# Patient Record
Sex: FEMALE | Race: WHITE | NOT HISPANIC OR LATINO | Employment: UNEMPLOYED | URBAN - METROPOLITAN AREA
[De-identification: names, ages, dates, MRNs, and addresses within clinical notes are randomized per-mention and may not be internally consistent; named-entity substitution may affect disease eponyms.]

---

## 2018-06-26 ENCOUNTER — CONVERSION ENCOUNTER (OUTPATIENT)
Dept: FAMILY MEDICINE CLINIC | Facility: CLINIC | Age: 38
End: 2018-06-26

## 2019-06-04 VITALS
OXYGEN SATURATION: 98 % | HEART RATE: 64 BPM | HEIGHT: 62 IN | DIASTOLIC BLOOD PRESSURE: 80 MMHG | BODY MASS INDEX: 23.3 KG/M2 | SYSTOLIC BLOOD PRESSURE: 129 MMHG | WEIGHT: 126.6 LBS

## 2019-07-15 RX ORDER — LISINOPRIL 2.5 MG/1
TABLET ORAL
Qty: 30 TABLET | Refills: 14 | Status: SHIPPED | OUTPATIENT
Start: 2019-07-15 | End: 2020-02-11

## 2019-07-15 RX ORDER — CARVEDILOL 3.12 MG/1
TABLET ORAL
Qty: 60 TABLET | Refills: 14 | Status: SHIPPED | OUTPATIENT
Start: 2019-07-15 | End: 2020-02-11

## 2019-08-28 ENCOUNTER — TELEPHONE (OUTPATIENT)
Dept: CARDIOLOGY | Facility: CLINIC | Age: 39
End: 2019-08-28

## 2019-08-28 NOTE — TELEPHONE ENCOUNTER
Patient called this am and l/m v/m that patient of Dr. Marshall and Dr. Hawthorne .   Stated needs a note stating that alright for her to work.   Patient last seen in office 6-26-18 by Dr. Domingo.  No follow up appointment on file.     Called # , but unsure that would allow to leave v/m , but did l/m to call office.

## 2019-08-29 NOTE — TELEPHONE ENCOUNTER
Called # again today and l/m v/m that needs to call office re: request for note to work/ not seen for awhile and call appt desk for appt/   Again unsure that # allowed to leave v/m .

## 2019-10-04 ENCOUNTER — OFFICE VISIT (OUTPATIENT)
Dept: CARDIOLOGY | Facility: CLINIC | Age: 39
End: 2019-10-04

## 2019-10-04 ENCOUNTER — CLINICAL SUPPORT NO REQUIREMENTS (OUTPATIENT)
Dept: CARDIOLOGY | Facility: CLINIC | Age: 39
End: 2019-10-04

## 2019-10-04 VITALS
WEIGHT: 134.2 LBS | DIASTOLIC BLOOD PRESSURE: 78 MMHG | HEART RATE: 65 BPM | OXYGEN SATURATION: 98 % | BODY MASS INDEX: 24.55 KG/M2 | SYSTOLIC BLOOD PRESSURE: 116 MMHG | RESPIRATION RATE: 18 BRPM

## 2019-10-04 DIAGNOSIS — Z95.810 ICD (IMPLANTABLE CARDIOVERTER-DEFIBRILLATOR) IN PLACE: ICD-10-CM

## 2019-10-04 DIAGNOSIS — I50.22 CHRONIC SYSTOLIC CONGESTIVE HEART FAILURE (HCC): ICD-10-CM

## 2019-10-04 DIAGNOSIS — I25.5 ISCHEMIC CARDIOMYOPATHY: Primary | ICD-10-CM

## 2019-10-04 DIAGNOSIS — I42.0 DILATED CARDIOMYOPATHY (HCC): ICD-10-CM

## 2019-10-04 DIAGNOSIS — R00.2 PALPITATIONS: Primary | ICD-10-CM

## 2019-10-04 PROCEDURE — 93282 PRGRMG EVAL IMPLANTABLE DFB: CPT | Performed by: INTERNAL MEDICINE

## 2019-10-04 PROCEDURE — 99214 OFFICE O/P EST MOD 30 MIN: CPT | Performed by: INTERNAL MEDICINE

## 2019-10-04 PROCEDURE — 93000 ELECTROCARDIOGRAM COMPLETE: CPT | Performed by: INTERNAL MEDICINE

## 2019-10-04 RX ORDER — NAPROXEN 500 MG/1
500 TABLET ORAL 2 TIMES DAILY
Refills: 0 | COMMUNITY
Start: 2019-09-09 | End: 2020-01-20

## 2019-10-04 RX ORDER — ASPIRIN 325 MG
1 TABLET ORAL DAILY
COMMUNITY
Start: 2015-03-10 | End: 2020-08-10 | Stop reason: SDUPTHER

## 2019-10-04 NOTE — PROGRESS NOTES
CC--cardiomyopathy    Sub--  39-year-old very pleasant patient with history of nonischemic cardiomyopathy underwent a single-chamber ICD implantation nearly 2 years ago for primary prevention and she is lost to follow-up because of insurance reasons and comes in for reestablishment to our office.  She continues to have class II fatigue, dyspnea and atypical chest pain without any syncope  She is able to take low-dose of beta-blockers and ACE inhibitors and denies any active leg edema or any syncope or dizziness since last visit  Patient denies any palpitations  She is trying to stop smoking  Chronic medical problems include nonischemic cardiomyopathy, ICD in situ, noncompliance and tobacco abuse        Past Medical History:   Diagnosis Date   • Alcohol abuse    • Cardiomyopathy (CMS/HCC)    • Cocaine abuse (CMS/HCC)    • History of LEEP (loop electrosurgical excision procedure) of cervix complicating pregnancy    • Myocardial infarction (CMS/HCC)      Past Surgical History:   Procedure Laterality Date   • CARDIAC CATHETERIZATION     • CARDIAC DEFIBRILLATOR PLACEMENT       Family History   Problem Relation Age of Onset   • Heart failure Mother      Social History     Tobacco Use   • Smoking status: Current Every Day Smoker     Packs/day: 0.75   • Smokeless tobacco: Never Used   Substance Use Topics   • Alcohol use: No     Frequency: Never   • Drug use: Yes     Comment: 1 year        (Not in a hospital admission)  Allergies:  Sulfa antibiotics    Review of Systems   General:  positive for fatigue and tiredness  Eyes: No redness  Cardiovascular: No chest pain, no palpitations  Respiratory:   positive for class 2 shortness of breath  Gastrointestinal: No nausea or vomiting, bleeding  Genitourinary: no hematuria or dysuria  Musculoskeletal: No arthralgia or myalgia  Skin: No rash  Neurologic: No numbness, tingling, syncope  Hematologic/Lymphatic: No abnormal bleeding      Physical Exam  VITALS REVIEWED--116/78, AL--65,  RR-12, AFEBRILE    General:      well developed, well nourished, in no acute distress.    Head:      normocephalic and atraumatic.    Eyes:      PERRL/EOM intact, conjunctiva and sclera clear with out nystagmus.    Neck:      no masses, thyromegaly,  trachea central with normal respiratory effort and PMI displaced laterally  Lungs:      clear bilaterally to auscultation.    Heart:       SINUS RHYTHM without any murmurs gallops or rubs, ICD SITE IS CLEAN  Msk:      no deformity or scoliosis noted of thoracic or lumbar spine.    Pulses:      pulses normal in all 4 extremities.    Extremities:       no cyanosis or clubbing--NO edema.    Neurologic:      no focal deficits.   alert oriented x3  Skin:      intact without lesions or rashes.    Psych:      alert and cooperative; normal mood and affect; normal attention span and concentration.          EKG shows sinus rhythm heart rate of 70 old septal infarct type of pattern [place] interval of 188 QRS of 75 QT interval 408 with right axis deviation and no significant changes compared to prior ECG and ECG indication includes cardiomyopathy    A/P  NON ISCHEMIC CARDIOMYOPATHY  CLASS 2 COMPENSATED CHF  TOBACCO ABUSE  ICD IN SITU WITH NORMAL FX--INTERROGATION ATTACHED  SMOKING CESSATION ENCOURAGED  ADVISED NICOTINE PATCH  MEDS REVIEWED  FOLLOW UP IN 6 MONTHS  Echo cardiography ordered to evaluate LV function for prognostication

## 2020-01-08 ENCOUNTER — HOSPITAL ENCOUNTER (OUTPATIENT)
Dept: CARDIOLOGY | Facility: HOSPITAL | Age: 40
Discharge: HOME | End: 2020-01-08
Admitting: INTERNAL MEDICINE

## 2020-01-08 VITALS
BODY MASS INDEX: 24.29 KG/M2 | HEIGHT: 62 IN | WEIGHT: 132 LBS | DIASTOLIC BLOOD PRESSURE: 69 MMHG | HEART RATE: 64 BPM | SYSTOLIC BLOOD PRESSURE: 135 MMHG

## 2020-01-08 DIAGNOSIS — I50.22 CHRONIC SYSTOLIC CONGESTIVE HEART FAILURE (HCC): ICD-10-CM

## 2020-01-08 DIAGNOSIS — I42.0 DILATED CARDIOMYOPATHY (HCC): ICD-10-CM

## 2020-01-08 PROCEDURE — 93306 TTE W/DOPPLER COMPLETE: CPT

## 2020-01-09 LAB
ASCENDING AORTA: 2.6 CM
BH CV ECHO MEAS - AO MAX PG (FULL): 5.5 MMHG
BH CV ECHO MEAS - AO MAX PG: 9.6 MMHG
BH CV ECHO MEAS - AO MEAN PG (FULL): 2.1 MMHG
BH CV ECHO MEAS - AO MEAN PG: 4 MMHG
BH CV ECHO MEAS - AO ROOT AREA (BSA CORRECTED): 1.8
BH CV ECHO MEAS - AO ROOT AREA: 6.9 CM^2
BH CV ECHO MEAS - AO ROOT DIAM: 3 CM
BH CV ECHO MEAS - AO V2 MAX: 155.1 CM/SEC
BH CV ECHO MEAS - AO V2 MEAN: 92.2 CM/SEC
BH CV ECHO MEAS - AO V2 VTI: 33.1 CM
BH CV ECHO MEAS - ASC AORTA: 2.6 CM
BH CV ECHO MEAS - AVA(I,A): 2.7 CM^2
BH CV ECHO MEAS - AVA(I,D): 2.7 CM^2
BH CV ECHO MEAS - AVA(V,A): 2.4 CM^2
BH CV ECHO MEAS - AVA(V,D): 2.4 CM^2
BH CV ECHO MEAS - BSA(HAYCOCK): 1.6 M^2
BH CV ECHO MEAS - BSA: 1.6 M^2
BH CV ECHO MEAS - BZI_BMI: 24.1 KILOGRAMS/M^2
BH CV ECHO MEAS - BZI_METRIC_HEIGHT: 157.5 CM
BH CV ECHO MEAS - BZI_METRIC_WEIGHT: 59.9 KG
BH CV ECHO MEAS - EDV(CUBED): 117.4 ML
BH CV ECHO MEAS - EDV(MOD-SP2): 148.3 ML
BH CV ECHO MEAS - EDV(MOD-SP4): 126.5 ML
BH CV ECHO MEAS - EDV(TEICH): 112.6 ML
BH CV ECHO MEAS - EF(CUBED): 63.2 %
BH CV ECHO MEAS - EF(MOD-BP): 45 %
BH CV ECHO MEAS - EF(MOD-SP2): 46.7 %
BH CV ECHO MEAS - EF(MOD-SP4): 49.3 %
BH CV ECHO MEAS - EF(TEICH): 54.5 %
BH CV ECHO MEAS - ESV(CUBED): 43.2 ML
BH CV ECHO MEAS - ESV(MOD-SP2): 79 ML
BH CV ECHO MEAS - ESV(MOD-SP4): 64.1 ML
BH CV ECHO MEAS - ESV(TEICH): 51.2 ML
BH CV ECHO MEAS - FS: 28.3 %
BH CV ECHO MEAS - IVS/LVPW: 1.2
BH CV ECHO MEAS - IVSD: 0.78 CM
BH CV ECHO MEAS - LA DIMENSION(2D): 3.8 CM
BH CV ECHO MEAS - LA DIMENSION: 3.6 CM
BH CV ECHO MEAS - LA/AO: 1.2
BH CV ECHO MEAS - LAT PEAK E' VEL: 11 CM/SEC
BH CV ECHO MEAS - LV DIASTOLIC VOL/BSA (35-75): 79 ML/M^2
BH CV ECHO MEAS - LV IVRT: 0.08 SEC
BH CV ECHO MEAS - LV MASS(C)D: 113.3 GRAMS
BH CV ECHO MEAS - LV MASS(C)DI: 70.7 GRAMS/M^2
BH CV ECHO MEAS - LV MAX PG: 4.1 MMHG
BH CV ECHO MEAS - LV MEAN PG: 1.9 MMHG
BH CV ECHO MEAS - LV SYSTOLIC VOL/BSA (12-30): 40 ML/M^2
BH CV ECHO MEAS - LV V1 MAX: 101.3 CM/SEC
BH CV ECHO MEAS - LV V1 MEAN: 63.7 CM/SEC
BH CV ECHO MEAS - LV V1 VTI: 23.6 CM
BH CV ECHO MEAS - LVIDD: 4.9 CM
BH CV ECHO MEAS - LVIDS: 3.5 CM
BH CV ECHO MEAS - LVOT AREA: 3.7 CM^2
BH CV ECHO MEAS - LVOT DIAM: 2.2 CM
BH CV ECHO MEAS - LVPWD: 0.65 CM
BH CV ECHO MEAS - MED PEAK E' VEL: 9 CM/SEC
BH CV ECHO MEAS - MR MAX VEL: 592 CM/SEC
BH CV ECHO MEAS - MR VTI: 242 CM
BH CV ECHO MEAS - MV A MAX VEL: 93.3 CM/SEC
BH CV ECHO MEAS - MV DEC SLOPE: 803.1 CM/SEC^2
BH CV ECHO MEAS - MV DEC TIME: 0.17 SEC
BH CV ECHO MEAS - MV E MAX VEL: 140.4 CM/SEC
BH CV ECHO MEAS - MV E/A: 1.5
BH CV ECHO MEAS - MV MAX PG: 6.8 MMHG
BH CV ECHO MEAS - MV MEAN PG: 2.8 MMHG
BH CV ECHO MEAS - MV P1/2T: 47 MSEC
BH CV ECHO MEAS - MV V2 MAX: 130.3 CM/SEC
BH CV ECHO MEAS - MV V2 MEAN: 78.8 CM/SEC
BH CV ECHO MEAS - MV V2 VTI: 29.3 CM
BH CV ECHO MEAS - MVA(P1/2T): 4.6 CM2
BH CV ECHO MEAS - MVA(VTI): 3 CM^2
BH CV ECHO MEAS - PA ACC SLOPE: 456 CM/SEC2
BH CV ECHO MEAS - PA ACC TIME: 0.15 SEC
BH CV ECHO MEAS - PA MAX PG (FULL): 1.2 MMHG
BH CV ECHO MEAS - PA MAX PG: 3.4 MMHG
BH CV ECHO MEAS - PA MEAN PG (FULL): 0.6 MMHG
BH CV ECHO MEAS - PA MEAN PG: 1.8 MMHG
BH CV ECHO MEAS - PA PR(ACCEL): 13.5 MMHG
BH CV ECHO MEAS - PA V2 MAX: 91.8 CM/SEC
BH CV ECHO MEAS - PA V2 MEAN: 64 CM/SEC
BH CV ECHO MEAS - PA V2 VTI: 17.9 CM
BH CV ECHO MEAS - PULM A REVS DUR: 0.12 SEC
BH CV ECHO MEAS - PULM A REVS VEL: 29.1 CM/SEC
BH CV ECHO MEAS - PULM DIAS VEL: 57.7 CM/SEC
BH CV ECHO MEAS - PULM S/D: 1.3
BH CV ECHO MEAS - PULM SYS VEL: 74.5 CM/SEC
BH CV ECHO MEAS - RAP SYSTOLE: 3 MMHG
BH CV ECHO MEAS - RV MAX PG: 2.2 MMHG
BH CV ECHO MEAS - RV MEAN PG: 1.2 MMHG
BH CV ECHO MEAS - RV V1 MAX: 73.8 CM/SEC
BH CV ECHO MEAS - RV V1 MEAN: 52.2 CM/SEC
BH CV ECHO MEAS - RV V1 VTI: 16.3 CM
BH CV ECHO MEAS - RVSP: 25.1 MMHG
BH CV ECHO MEAS - SI(AO): 141.7 ML/M^2
BH CV ECHO MEAS - SI(CUBED): 46.3 ML/M^2
BH CV ECHO MEAS - SI(LVOT): 55.2 ML/M^2
BH CV ECHO MEAS - SI(MOD-SP2): 43.3 ML/M^2
BH CV ECHO MEAS - SI(MOD-SP4): 39 ML/M^2
BH CV ECHO MEAS - SI(TEICH): 38.3 ML/M^2
BH CV ECHO MEAS - SUP REN AO DIAM: 2 CM
BH CV ECHO MEAS - SV(AO): 227.1 ML
BH CV ECHO MEAS - SV(CUBED): 74.1 ML
BH CV ECHO MEAS - SV(LVOT): 88.5 ML
BH CV ECHO MEAS - SV(MOD-SP2): 69.3 ML
BH CV ECHO MEAS - SV(MOD-SP4): 62.4 ML
BH CV ECHO MEAS - SV(TEICH): 61.4 ML
BH CV ECHO MEAS - TAPSE (>1.6): 2.5 CM2
BH CV ECHO MEAS - TR MAX VEL: 235 CM/SEC
BH CV ECHO MEASUREMENTS AVERAGE E/E' RATIO: 14.04
BH CV XLRA - RV BASE: 3.1 CM
BH CV XLRA - RV MID: 2.6 CM
BH CV XLRA - TDI S': 15 CM/SEC
IVRT: 77 MSEC
LEFT ATRIUM VOLUME INDEX: 36.6 ML/M2
LEFT ATRIUM VOLUME: 59 CM3
LV EF 2D ECHO EST: 50 %
MAXIMAL PREDICTED HEART RATE: 180 BPM
MR PISA EROA: 0.1 CM2
MV REGURGITANT VOLUME: 30 CC
PISA ALIASING VEL: 0.35 M/S
PISA RADIUS: 0.6 CM
STRESS TARGET HR: 153 BPM

## 2020-01-09 PROCEDURE — 93306 TTE W/DOPPLER COMPLETE: CPT | Performed by: INTERNAL MEDICINE

## 2020-01-13 ENCOUNTER — CLINICAL SUPPORT NO REQUIREMENTS (OUTPATIENT)
Dept: CARDIOLOGY | Facility: CLINIC | Age: 40
End: 2020-01-13

## 2020-01-13 DIAGNOSIS — I42.9 CARDIOMYOPATHY, UNSPECIFIED TYPE (HCC): Primary | ICD-10-CM

## 2020-01-14 ENCOUNTER — TELEPHONE (OUTPATIENT)
Dept: CARDIOLOGY | Facility: CLINIC | Age: 40
End: 2020-01-14

## 2020-01-14 NOTE — TELEPHONE ENCOUNTER
----- Message from DAIANA Gomez sent at 1/14/2020 12:48 PM EST -----  I meant we'd follow in the office with symptoms and periodic echos.  She should keep her next office appt, if her symptoms are new or worrisome, she can be scheduled for earlier date (no idea when her appt is).     Taty  ----- Message -----  From: Virgen Zayas MA  Sent: 1/14/2020   8:29 AM EST  To: DAIANA Gomez    Spoke withpt regarding results, she stated she has some slight shortness of breath and some chest pain.  She said it feels like a lightening bolt going through her when it occurs.   ----- Message -----  From: Taty Conway APRN  Sent: 1/13/2020  12:34 PM EST  To: Virgen Zayas MA    Evidence of low LV function/cardiomyopathy, EF 50%, moderate to severe mitral regurgitation (will need monitoring of symptoms and periodic echo to watch the valve)    Taty  ----- Message -----  From: Virgen Zayas MA  Sent: 1/13/2020  11:58 AM EST  To: DAIANA Gomez    Pt called requesting results of echo.  Can you interpret so I can call the pt?

## 2020-01-14 NOTE — TELEPHONE ENCOUNTER
Spoke with pt regarding echo results, she requested a sooner appointment.  She is scheduled for 1/20/2020.  Pt will call with any other concerns.

## 2020-01-20 ENCOUNTER — PREP FOR SURGERY (OUTPATIENT)
Dept: OTHER | Facility: HOSPITAL | Age: 40
End: 2020-01-20

## 2020-01-20 ENCOUNTER — CLINICAL SUPPORT NO REQUIREMENTS (OUTPATIENT)
Dept: CARDIOLOGY | Facility: CLINIC | Age: 40
End: 2020-01-20

## 2020-01-20 ENCOUNTER — OFFICE VISIT (OUTPATIENT)
Dept: CARDIOLOGY | Facility: CLINIC | Age: 40
End: 2020-01-20

## 2020-01-20 VITALS
OXYGEN SATURATION: 98 % | BODY MASS INDEX: 25.47 KG/M2 | HEART RATE: 65 BPM | DIASTOLIC BLOOD PRESSURE: 79 MMHG | WEIGHT: 138.4 LBS | HEIGHT: 62 IN | SYSTOLIC BLOOD PRESSURE: 120 MMHG

## 2020-01-20 DIAGNOSIS — Z95.810 ICD (IMPLANTABLE CARDIOVERTER-DEFIBRILLATOR) IN PLACE: ICD-10-CM

## 2020-01-20 DIAGNOSIS — I42.9 CARDIOMYOPATHY, UNSPECIFIED TYPE (HCC): Primary | ICD-10-CM

## 2020-01-20 DIAGNOSIS — I42.9 CARDIOMYOPATHY, UNSPECIFIED TYPE (HCC): ICD-10-CM

## 2020-01-20 DIAGNOSIS — I34.0 NONRHEUMATIC MITRAL VALVE REGURGITATION: ICD-10-CM

## 2020-01-20 DIAGNOSIS — R06.83 SNORING: ICD-10-CM

## 2020-01-20 DIAGNOSIS — I50.22 CHRONIC SYSTOLIC CONGESTIVE HEART FAILURE (HCC): Primary | ICD-10-CM

## 2020-01-20 PROCEDURE — 99214 OFFICE O/P EST MOD 30 MIN: CPT | Performed by: INTERNAL MEDICINE

## 2020-01-20 PROCEDURE — 93000 ELECTROCARDIOGRAM COMPLETE: CPT | Performed by: INTERNAL MEDICINE

## 2020-01-20 RX ORDER — SODIUM CHLORIDE 9 MG/ML
80 INJECTION, SOLUTION INTRAVENOUS CONTINUOUS
Status: CANCELLED | OUTPATIENT
Start: 2020-01-20

## 2020-01-20 NOTE — PROGRESS NOTES
CC--cardiomyopathy, fatigue    Sub--39-year-old very pleasant patient with history of nonischemic cardiomyopathy underwent a single-chamber ICD implantation few years ago for primary prevention and came for follow.  She continues to have class II fatigue, dyspnea and atypical chest pain without any syncope  She is able to take low-dose of beta-blockers and ACE inhibitors and denies any active leg edema or any syncope or dizziness since last visit  Patient denies any palpitations  She is trying to stop smoking  Chronic medical problems include nonischemic cardiomyopathy, ICD in situ, noncompliance and tobacco abuse  She complains of fatigue and her spouse has noticed nocturnal apnea type of spells with snoring  Prior echocardiography showed EF improvement to 50% with moderate to severe mitral regurgitation        Past Medical History:   Diagnosis Date   • Alcohol abuse    • Cardiomyopathy (CMS/HCC)    • Cocaine abuse (CMS/HCC)    • History of LEEP (loop electrosurgical excision procedure) of cervix complicating pregnancy    • Myocardial infarction (CMS/HCC)      Past Surgical History:   Procedure Laterality Date   • CARDIAC CATHETERIZATION     • CARDIAC DEFIBRILLATOR PLACEMENT       Family History   Problem Relation Age of Onset   • Heart failure Mother    • Heart failure Daughter      Social History     Tobacco Use   • Smoking status: Current Every Day Smoker     Packs/day: 0.75   • Smokeless tobacco: Never Used   Substance Use Topics   • Alcohol use: No     Frequency: Never   • Drug use: Yes     Comment: 1 year        (Not in a hospital admission)  Allergies:  Sulfa antibiotics    Review of Systems   General:  positive for fatigue and tiredness  Eyes: No redness  Cardiovascular: No chest pain, no palpitations  Respiratory:   positive for class 2 shortness of breath  Gastrointestinal: No nausea or vomiting, bleeding  Genitourinary: no hematuria or dysuria  Musculoskeletal: No arthralgia or myalgia  Skin: No  rash  Neurologic: No numbness, tingling, syncope  Hematologic/Lymphatic: No abnormal bleeding      Physical Exam  VITALS REVIEWED--120/78, [place]--65, RR-12, AFEBRILE    General:      well developed, well nourished, in no acute distress.    Head:      normocephalic and atraumatic.    Eyes:      PERRL/EOM intact, conjunctiva and sclera clear with out nystagmus.    Neck:      no masses, thyromegaly,  trachea central with normal respiratory effort and PMI displaced laterally  Lungs:      clear bilaterally to auscultation.    Heart:       SINUS RHYTHM without any murmurs gallops or rubs, ICD SITE IS CLEAN  Msk:      no deformity or scoliosis noted of thoracic or lumbar spine.    Pulses:      pulses normal in all 4 extremities.    Extremities:       no cyanosis or clubbing--NO edema.    Neurologic:      no focal deficits.   alert oriented x3  Skin:      intact without lesions or rashes.    Psych:      alert and cooperative; normal mood and affect; normal attention span and concentration.          EKG shows sinus rhythm heart rate of 65 old septal infarct type of pattern [place] interval of 183 QRS of 83 QT interval 405 with right axis deviation and no significant changes compared to prior ECG and ECG indication includes cardiomyopathy, mitral regurgitation and fatigue    A/P  NON ISCHEMIC CARDIOMYOPATHY  CLASS 2 COMPENSATED CHF  TOBACCO ABUSE  ICD IN SITU WITH NORMAL FX--INTERROGATION ATTACHED  SMOKING CESSATION ENCOURAGED  Schedule patient for ATA to evaluate mitral regurgitation  Schedule patient for pulmonary referral for occult sleep apnea because of symptoms of nocturnal snoring and apnea spells    Medications reviewed    Electronically signed by Blayne Hawthorne MD, 01/20/20, 6:16 PM.

## 2020-01-21 PROCEDURE — 93282 PRGRMG EVAL IMPLANTABLE DFB: CPT | Performed by: INTERNAL MEDICINE

## 2020-01-23 ENCOUNTER — TELEPHONE (OUTPATIENT)
Dept: CARDIOLOGY | Facility: CLINIC | Age: 40
End: 2020-01-23

## 2020-01-23 NOTE — TELEPHONE ENCOUNTER
Pt called asking about her imelda procedure.  She was concerned about labs.  I let her know there are no labs needed.  She also inquired about sleep referral I gave her Dr Lees office number to contact the office to schedule an appointment.  She states she understands and will call with any concerns.

## 2020-02-04 ENCOUNTER — ANESTHESIA EVENT (OUTPATIENT)
Dept: CARDIOLOGY | Facility: HOSPITAL | Age: 40
End: 2020-02-04

## 2020-02-04 RX ORDER — SODIUM CHLORIDE 9 MG/ML
9 INJECTION, SOLUTION INTRAVENOUS CONTINUOUS PRN
Status: CANCELLED | OUTPATIENT
Start: 2020-02-04

## 2020-02-04 RX ORDER — SODIUM CHLORIDE 0.9 % (FLUSH) 0.9 %
10 SYRINGE (ML) INJECTION EVERY 12 HOURS SCHEDULED
Status: CANCELLED | OUTPATIENT
Start: 2020-02-04

## 2020-02-04 RX ORDER — SODIUM CHLORIDE 0.9 % (FLUSH) 0.9 %
10 SYRINGE (ML) INJECTION AS NEEDED
Status: CANCELLED | OUTPATIENT
Start: 2020-02-04

## 2020-02-05 ENCOUNTER — HOSPITAL ENCOUNTER (OUTPATIENT)
Dept: CARDIOLOGY | Facility: HOSPITAL | Age: 40
Discharge: HOME | End: 2020-02-05
Admitting: INTERNAL MEDICINE

## 2020-02-05 ENCOUNTER — ANESTHESIA (OUTPATIENT)
Dept: CARDIOLOGY | Facility: HOSPITAL | Age: 40
End: 2020-02-05

## 2020-02-05 VITALS
WEIGHT: 140.21 LBS | SYSTOLIC BLOOD PRESSURE: 97 MMHG | DIASTOLIC BLOOD PRESSURE: 50 MMHG | RESPIRATION RATE: 19 BRPM | HEART RATE: 70 BPM | TEMPERATURE: 97.9 F | OXYGEN SATURATION: 98 % | BODY MASS INDEX: 23.94 KG/M2 | HEIGHT: 64 IN

## 2020-02-05 DIAGNOSIS — I34.0 NONRHEUMATIC MITRAL VALVE REGURGITATION: ICD-10-CM

## 2020-02-05 DIAGNOSIS — I42.9 CARDIOMYOPATHY, UNSPECIFIED TYPE (HCC): ICD-10-CM

## 2020-02-05 DIAGNOSIS — I38 VALVULAR HEART DISEASE: Primary | ICD-10-CM

## 2020-02-05 LAB
BH CV ECHO MEAS - BSA(HAYCOCK): 1.7 M^2
BH CV ECHO MEAS - BSA: 1.6 M^2
BH CV ECHO MEAS - BZI_BMI: 25.2 KILOGRAMS/M^2
BH CV ECHO MEAS - BZI_METRIC_HEIGHT: 157.5 CM
BH CV ECHO MEAS - BZI_METRIC_WEIGHT: 62.6 KG
BH CV ECHO MEAS - EF(MOD-BP): 60 %
BH CV ECHO MEAS - MV MAX PG: 5.3 MMHG
BH CV ECHO MEAS - MV MEAN PG: 2.3 MMHG
BH CV ECHO MEAS - MV V2 MAX: 115.1 CM/SEC
BH CV ECHO MEAS - MV V2 MEAN: 71.1 CM/SEC
BH CV ECHO MEAS - MV V2 VTI: 24 CM
BH CV ECHO MEAS - RAP SYSTOLE: 3 MMHG
BH CV ECHO MEAS - RVSP: 17.3 MMHG
BH CV ECHO MEAS - TR MAX VEL: 189 CM/SEC
LV EF 2D ECHO EST: 60 %

## 2020-02-05 PROCEDURE — 93325 DOPPLER ECHO COLOR FLOW MAPG: CPT | Performed by: INTERNAL MEDICINE

## 2020-02-05 PROCEDURE — 93312 ECHO TRANSESOPHAGEAL: CPT

## 2020-02-05 PROCEDURE — 93320 DOPPLER ECHO COMPLETE: CPT

## 2020-02-05 PROCEDURE — 25010000002 PROPOFOL 200 MG/20ML EMULSION: Performed by: ANESTHESIOLOGY

## 2020-02-05 PROCEDURE — 93312 ECHO TRANSESOPHAGEAL: CPT | Performed by: INTERNAL MEDICINE

## 2020-02-05 PROCEDURE — 93325 DOPPLER ECHO COLOR FLOW MAPG: CPT

## 2020-02-05 PROCEDURE — 93320 DOPPLER ECHO COMPLETE: CPT | Performed by: INTERNAL MEDICINE

## 2020-02-05 RX ORDER — ASPIRIN 81 MG/1
81 TABLET ORAL DAILY
Status: CANCELLED | OUTPATIENT
Start: 2020-02-05

## 2020-02-05 RX ORDER — NITROGLYCERIN 0.4 MG/1
0.4 TABLET SUBLINGUAL
Status: CANCELLED | OUTPATIENT
Start: 2020-02-05

## 2020-02-05 RX ORDER — DIPHENHYDRAMINE HYDROCHLORIDE 50 MG/ML
50 INJECTION INTRAMUSCULAR; INTRAVENOUS ONCE
Status: CANCELLED | OUTPATIENT
Start: 2020-02-05 | End: 2020-02-05

## 2020-02-05 RX ORDER — PROPOFOL 10 MG/ML
INJECTION, EMULSION INTRAVENOUS AS NEEDED
Status: DISCONTINUED | OUTPATIENT
Start: 2020-02-05 | End: 2020-02-05 | Stop reason: SURG

## 2020-02-05 RX ORDER — ASPIRIN 81 MG/1
324 TABLET, CHEWABLE ORAL ONCE
Status: CANCELLED | OUTPATIENT
Start: 2020-02-05

## 2020-02-05 RX ORDER — SODIUM CHLORIDE 9 MG/ML
INJECTION, SOLUTION INTRAVENOUS CONTINUOUS PRN
Status: DISCONTINUED | OUTPATIENT
Start: 2020-02-05 | End: 2020-02-05 | Stop reason: SURG

## 2020-02-05 RX ORDER — ONDANSETRON 2 MG/ML
4 INJECTION INTRAMUSCULAR; INTRAVENOUS EVERY 6 HOURS PRN
Status: CANCELLED | OUTPATIENT
Start: 2020-02-05

## 2020-02-05 RX ORDER — HYDROCODONE BITARTRATE AND ACETAMINOPHEN 5; 325 MG/1; MG/1
1 TABLET ORAL EVERY 4 HOURS PRN
Status: CANCELLED | OUTPATIENT
Start: 2020-02-05 | End: 2020-02-15

## 2020-02-05 RX ADMIN — PROPOFOL 50 MG: 10 INJECTION, EMULSION INTRAVENOUS at 13:36

## 2020-02-05 RX ADMIN — PROPOFOL 50 MG: 10 INJECTION, EMULSION INTRAVENOUS at 13:26

## 2020-02-05 RX ADMIN — SODIUM CHLORIDE: 0.9 INJECTION, SOLUTION INTRAVENOUS at 13:24

## 2020-02-05 RX ADMIN — PROPOFOL 50 MG: 10 INJECTION, EMULSION INTRAVENOUS at 13:32

## 2020-02-05 RX ADMIN — PROPOFOL 50 MG: 10 INJECTION, EMULSION INTRAVENOUS at 13:39

## 2020-02-05 NOTE — ANESTHESIA POSTPROCEDURE EVALUATION
Patient: Yvonne Keys    Procedure Summary     Date:  02/05/20 Room / Location:  [facility]    Anesthesia Start:  1324 Anesthesia Stop:  1342    Procedure:  ADULT TRANSESOPHAGEAL ECHO (ATA) W/ CONT IF NECESSARY PER PROTOCOL Diagnosis:       Cardiomyopathy, unspecified type (CMS/HCC)      Nonrheumatic mitral valve regurgitation      (Valvular Disease)    Scheduled Providers:  Blayne Hawthorne MD Provider:  Gato Castellanos MD    Anesthesia Type:  MAC ASA Status:  3          Anesthesia Type: MAC    Vitals  Vitals Value Taken Time   BP 97/50 2/5/2020  2:31 PM   Temp     Pulse 70 2/5/2020  2:39 PM   Resp     SpO2 95 % 2/5/2020  2:23 PM   Vitals shown include unvalidated device data.        Post Anesthesia Care and Evaluation    Patient location during evaluation: PACU  Patient participation: complete - patient participated  Level of consciousness: awake, responsive to physical stimuli and responsive to verbal stimuli  Pain scale: See nurse's notes for pain score.  Pain management: adequate  Airway patency: patent  Anesthetic complications: No anesthetic complications  PONV Status: none  Cardiovascular status: acceptable  Respiratory status: acceptable and nasal cannula  Hydration status: acceptable    Comments: Patient seen and examined postoperatively; vital signs stable; SpO2 greater than or equal to 90%; cardiopulmonary status stable; nausea/vomiting adequately controlled; pain adequately controlled; no apparent anesthesia complications; patient discharged from anesthesia care when discharge criteria were met

## 2020-02-05 NOTE — ADDENDUM NOTE
Addendum  created 02/05/20 1536 by Wang Beltran MD    Intraprocedure Event edited, Sign clinical note

## 2020-02-05 NOTE — ANESTHESIA PREPROCEDURE EVALUATION
Anesthesia Evaluation     NPO Solid Status: > 8 hours  NPO Liquid Status: > 8 hours           Airway   Mallampati: II  TM distance: >3 FB  No difficulty expected  Dental - normal exam     Pulmonary    (+) a smoker Current Smoked day of surgery,   Cardiovascular         Neuro/Psych  (+) dizziness/light headedness, psychiatric history Anxiety,     GI/Hepatic/Renal/Endo      Musculoskeletal     Abdominal    Substance History   (+) alcohol use, drug use      Comment: In past   OB/GYN          Other        ROS/Med Hx Other: nonischemic cardiomyopathy underwent a single-chamber ICD implantation  tobacco abuse                Anesthesia Plan    ASA 3     MAC     intravenous induction     Anesthetic plan, all risks, benefits, and alternatives have been provided, discussed and informed consent has been obtained with: patient.

## 2020-02-05 NOTE — DISCHARGE INSTRUCTIONS
ATA DC Instructions    1) The medication, which was used to put the patient to sleep, will be acting in your body for the next twenty-four (24) hours, so you might feel a little sleepy; this feeling will slowly wear off.  Because the medicine is still in your system for the next twenty-four (24) hours, the adult patient SHOULD NOT:    a. Drive a car, operate machinery, or power tools  b. Drink any alcoholic drinks (not even beer)  c. Make any important decisions such as to sign important papers      2) We strongly suggest that a responsible adult be with the patient the rest of the day.    3) Any problems with:    a. EXCESSIVE MUCOUS  b. SPITTING UP BLOOD  c. SORE THROAT AT MORE THAN 72 HOURS    NOTIFY DR. MOROCHO [phone] OR CALL THE [facility] [phone].

## 2020-02-11 RX ORDER — CARVEDILOL 3.12 MG/1
3.12 TABLET ORAL 2 TIMES DAILY WITH MEALS
COMMUNITY
End: 2020-08-10 | Stop reason: SDUPTHER

## 2020-02-11 RX ORDER — LISINOPRIL 2.5 MG/1
2.5 TABLET ORAL DAILY
COMMUNITY
End: 2020-08-12 | Stop reason: SDUPTHER

## 2020-02-12 ENCOUNTER — APPOINTMENT (OUTPATIENT)
Dept: CARDIOLOGY | Facility: HOSPITAL | Age: 40
End: 2020-02-12

## 2020-02-12 ENCOUNTER — HOSPITAL ENCOUNTER (OUTPATIENT)
Facility: HOSPITAL | Age: 40
Setting detail: HOSPITAL OUTPATIENT SURGERY
Discharge: HOME | End: 2020-02-12
Attending: INTERNAL MEDICINE | Admitting: INTERNAL MEDICINE

## 2020-02-12 ENCOUNTER — APPOINTMENT (OUTPATIENT)
Dept: GENERAL RADIOLOGY | Facility: HOSPITAL | Age: 40
End: 2020-02-12

## 2020-02-12 VITALS
OXYGEN SATURATION: 97 % | TEMPERATURE: 98 F | SYSTOLIC BLOOD PRESSURE: 102 MMHG | BODY MASS INDEX: 23.9 KG/M2 | RESPIRATION RATE: 16 BRPM | DIASTOLIC BLOOD PRESSURE: 51 MMHG | HEART RATE: 65 BPM | HEIGHT: 64 IN | WEIGHT: 139.99 LBS

## 2020-02-12 DIAGNOSIS — I38 VALVULAR HEART DISEASE: ICD-10-CM

## 2020-02-12 LAB
ANION GAP SERPL CALCULATED.3IONS-SCNC: 10 MMOL/L (ref 5–15)
ARTERIAL PATENCY WRIST A: ABNORMAL
ATMOSPHERIC PRESS: ABNORMAL MM[HG]
BASE EXCESS BLDA CALC-SCNC: -0.5 MMOL/L (ref 0–3)
BASOPHILS # BLD AUTO: 0 10*3/MM3 (ref 0–0.2)
BASOPHILS NFR BLD AUTO: 0.5 % (ref 0–1.5)
BDY SITE: ABNORMAL
BUN BLD-MCNC: 19 MG/DL (ref 6–20)
BUN/CREAT SERPL: 26.8 (ref 7–25)
CALCIUM SPEC-SCNC: 9.5 MG/DL (ref 8.6–10.5)
CHLORIDE SERPL-SCNC: 103 MMOL/L (ref 98–107)
CHOLEST SERPL-MCNC: 205 MG/DL (ref 0–200)
CO2 BLDA-SCNC: 25.7 MMOL/L (ref 22–29)
CO2 SERPL-SCNC: 26 MMOL/L (ref 22–29)
CREAT BLD-MCNC: 0.71 MG/DL (ref 0.57–1)
DEPRECATED RDW RBC AUTO: 45.5 FL (ref 37–54)
EOSINOPHIL # BLD AUTO: 0.4 10*3/MM3 (ref 0–0.4)
EOSINOPHIL NFR BLD AUTO: 6 % (ref 0.3–6.2)
ERYTHROCYTE [DISTWIDTH] IN BLOOD BY AUTOMATED COUNT: 13.1 % (ref 12.3–15.4)
GFR SERPL CREATININE-BSD FRML MDRD: 91 ML/MIN/1.73
GLUCOSE BLD-MCNC: 94 MG/DL (ref 65–99)
HCO3 BLDA-SCNC: 24.5 MMOL/L (ref 21–28)
HCT VFR BLD AUTO: 39.5 % (ref 34–46.6)
HDLC SERPL-MCNC: 100 MG/DL (ref 40–60)
HEMODILUTION: NO
HGB BLD-MCNC: 13.5 G/DL (ref 12–15.9)
HOROWITZ INDEX BLD+IHG-RTO: 21 %
INR PPP: 1.04 (ref 0.9–1.1)
LDLC SERPL CALC-MCNC: 87 MG/DL (ref 0–100)
LDLC/HDLC SERPL: 0.87 {RATIO}
LYMPHOCYTES # BLD AUTO: 2.8 10*3/MM3 (ref 0.7–3.1)
LYMPHOCYTES NFR BLD AUTO: 46.7 % (ref 19.6–45.3)
MCH RBC QN AUTO: 33.6 PG (ref 26.6–33)
MCHC RBC AUTO-ENTMCNC: 34.3 G/DL (ref 31.5–35.7)
MCV RBC AUTO: 98.1 FL (ref 79–97)
MODALITY: ABNORMAL
MONOCYTES # BLD AUTO: 0.5 10*3/MM3 (ref 0.1–0.9)
MONOCYTES NFR BLD AUTO: 8.4 % (ref 5–12)
NEUTROPHILS # BLD AUTO: 2.3 10*3/MM3 (ref 1.7–7)
NEUTROPHILS NFR BLD AUTO: 38.4 % (ref 42.7–76)
NRBC BLD AUTO-RTO: 0.1 /100 WBC (ref 0–0.2)
PCO2 BLDA: 40.6 MM HG (ref 35–48)
PH BLDA: 7.39 PH UNITS (ref 7.35–7.45)
PLATELET # BLD AUTO: 254 10*3/MM3 (ref 140–450)
PMV BLD AUTO: 8 FL (ref 6–12)
PO2 BLDA: 88 MM HG (ref 83–108)
POTASSIUM BLD-SCNC: 4.5 MMOL/L (ref 3.5–5.2)
PROTHROMBIN TIME: 10.8 SECONDS (ref 9.6–11.7)
RBC # BLD AUTO: 4.02 10*6/MM3 (ref 3.77–5.28)
SAO2 % BLDCOA: 96.6 % (ref 94–98)
SODIUM BLD-SCNC: 139 MMOL/L (ref 136–145)
TRIGL SERPL-MCNC: 92 MG/DL (ref 0–150)
VLDLC SERPL-MCNC: 18.4 MG/DL
WBC NRBC COR # BLD: 6.1 10*3/MM3 (ref 3.4–10.8)

## 2020-02-12 PROCEDURE — 25010000002 FENTANYL CITRATE (PF) 100 MCG/2ML SOLUTION: Performed by: INTERNAL MEDICINE

## 2020-02-12 PROCEDURE — 93460 R&L HRT ART/VENTRICLE ANGIO: CPT | Performed by: INTERNAL MEDICINE

## 2020-02-12 PROCEDURE — 36600 WITHDRAWAL OF ARTERIAL BLOOD: CPT

## 2020-02-12 PROCEDURE — C1769 GUIDE WIRE: HCPCS | Performed by: INTERNAL MEDICINE

## 2020-02-12 PROCEDURE — 71046 X-RAY EXAM CHEST 2 VIEWS: CPT

## 2020-02-12 PROCEDURE — 82803 BLOOD GASES ANY COMBINATION: CPT

## 2020-02-12 PROCEDURE — 99153 MOD SED SAME PHYS/QHP EA: CPT | Performed by: INTERNAL MEDICINE

## 2020-02-12 PROCEDURE — 93005 ELECTROCARDIOGRAM TRACING: CPT | Performed by: INTERNAL MEDICINE

## 2020-02-12 PROCEDURE — 80048 BASIC METABOLIC PNL TOTAL CA: CPT | Performed by: INTERNAL MEDICINE

## 2020-02-12 PROCEDURE — 85610 PROTHROMBIN TIME: CPT | Performed by: INTERNAL MEDICINE

## 2020-02-12 PROCEDURE — C1894 INTRO/SHEATH, NON-LASER: HCPCS | Performed by: INTERNAL MEDICINE

## 2020-02-12 PROCEDURE — 80061 LIPID PANEL: CPT | Performed by: INTERNAL MEDICINE

## 2020-02-12 PROCEDURE — 0 IOPAMIDOL PER 1 ML: Performed by: INTERNAL MEDICINE

## 2020-02-12 PROCEDURE — 99152 MOD SED SAME PHYS/QHP 5/>YRS: CPT | Performed by: INTERNAL MEDICINE

## 2020-02-12 PROCEDURE — 25010000002 MIDAZOLAM PER 1 MG: Performed by: INTERNAL MEDICINE

## 2020-02-12 PROCEDURE — 85025 COMPLETE CBC W/AUTO DIFF WBC: CPT | Performed by: INTERNAL MEDICINE

## 2020-02-12 RX ORDER — ALUMINA, MAGNESIA, AND SIMETHICONE 2400; 2400; 240 MG/30ML; MG/30ML; MG/30ML
15 SUSPENSION ORAL EVERY 6 HOURS PRN
Status: DISCONTINUED | OUTPATIENT
Start: 2020-02-12 | End: 2020-02-12 | Stop reason: HOSPADM

## 2020-02-12 RX ORDER — DIPHENHYDRAMINE HCL 25 MG
25 TABLET ORAL EVERY 6 HOURS PRN
Status: DISCONTINUED | OUTPATIENT
Start: 2020-02-12 | End: 2020-02-12 | Stop reason: HOSPADM

## 2020-02-12 RX ORDER — BISACODYL 5 MG/1
5 TABLET, DELAYED RELEASE ORAL DAILY PRN
Status: DISCONTINUED | OUTPATIENT
Start: 2020-02-12 | End: 2020-02-12 | Stop reason: HOSPADM

## 2020-02-12 RX ORDER — SODIUM CHLORIDE 9 MG/ML
250 INJECTION, SOLUTION INTRAVENOUS ONCE AS NEEDED
Status: DISCONTINUED | OUTPATIENT
Start: 2020-02-12 | End: 2020-02-12 | Stop reason: HOSPADM

## 2020-02-12 RX ORDER — ONDANSETRON 2 MG/ML
4 INJECTION INTRAMUSCULAR; INTRAVENOUS EVERY 6 HOURS PRN
Status: DISCONTINUED | OUTPATIENT
Start: 2020-02-12 | End: 2020-02-12 | Stop reason: HOSPADM

## 2020-02-12 RX ORDER — LIDOCAINE HYDROCHLORIDE 20 MG/ML
INJECTION, SOLUTION INFILTRATION; PERINEURAL AS NEEDED
Status: DISCONTINUED | OUTPATIENT
Start: 2020-02-12 | End: 2020-02-12 | Stop reason: HOSPADM

## 2020-02-12 RX ORDER — ACETAMINOPHEN 325 MG/1
650 TABLET ORAL EVERY 4 HOURS PRN
Status: DISCONTINUED | OUTPATIENT
Start: 2020-02-12 | End: 2020-02-12 | Stop reason: HOSPADM

## 2020-02-12 RX ORDER — BISACODYL 10 MG
10 SUPPOSITORY, RECTAL RECTAL DAILY PRN
Status: DISCONTINUED | OUTPATIENT
Start: 2020-02-12 | End: 2020-02-12 | Stop reason: HOSPADM

## 2020-02-12 RX ORDER — NITROGLYCERIN 0.4 MG/1
0.4 TABLET SUBLINGUAL
Status: DISCONTINUED | OUTPATIENT
Start: 2020-02-12 | End: 2020-02-12 | Stop reason: HOSPADM

## 2020-02-12 RX ORDER — HYDROCODONE BITARTRATE AND ACETAMINOPHEN 5; 325 MG/1; MG/1
1 TABLET ORAL EVERY 4 HOURS PRN
Status: DISCONTINUED | OUTPATIENT
Start: 2020-02-12 | End: 2020-02-12 | Stop reason: HOSPADM

## 2020-02-12 RX ORDER — ASPIRIN 81 MG/1
81 TABLET ORAL DAILY
Status: DISCONTINUED | OUTPATIENT
Start: 2020-02-12 | End: 2020-02-12 | Stop reason: SDUPTHER

## 2020-02-12 RX ORDER — ATROPINE SULFATE 1 MG/ML
.5-1 INJECTION, SOLUTION INTRAMUSCULAR; INTRAVENOUS; SUBCUTANEOUS
Status: DISCONTINUED | OUTPATIENT
Start: 2020-02-12 | End: 2020-02-12 | Stop reason: HOSPADM

## 2020-02-12 RX ORDER — DIPHENHYDRAMINE HYDROCHLORIDE 50 MG/ML
50 INJECTION INTRAMUSCULAR; INTRAVENOUS ONCE
Status: DISCONTINUED | OUTPATIENT
Start: 2020-02-12 | End: 2020-02-12 | Stop reason: HOSPADM

## 2020-02-12 RX ORDER — ASPIRIN 81 MG/1
81 TABLET ORAL DAILY
Status: DISCONTINUED | OUTPATIENT
Start: 2020-02-12 | End: 2020-02-12 | Stop reason: HOSPADM

## 2020-02-12 RX ORDER — AMOXICILLIN 250 MG
2 CAPSULE ORAL 2 TIMES DAILY
Status: DISCONTINUED | OUTPATIENT
Start: 2020-02-12 | End: 2020-02-12 | Stop reason: HOSPADM

## 2020-02-12 RX ORDER — ONDANSETRON 4 MG/1
4 TABLET, FILM COATED ORAL EVERY 6 HOURS PRN
Status: DISCONTINUED | OUTPATIENT
Start: 2020-02-12 | End: 2020-02-12 | Stop reason: HOSPADM

## 2020-02-12 RX ORDER — METHYLPREDNISOLONE SODIUM SUCCINATE 125 MG/2ML
120 INJECTION, POWDER, LYOPHILIZED, FOR SOLUTION INTRAMUSCULAR; INTRAVENOUS ONCE
Status: DISCONTINUED | OUTPATIENT
Start: 2020-02-12 | End: 2020-02-12 | Stop reason: HOSPADM

## 2020-02-12 RX ORDER — MIDAZOLAM HYDROCHLORIDE 1 MG/ML
INJECTION INTRAMUSCULAR; INTRAVENOUS AS NEEDED
Status: DISCONTINUED | OUTPATIENT
Start: 2020-02-12 | End: 2020-02-12 | Stop reason: HOSPADM

## 2020-02-12 RX ORDER — SODIUM CHLORIDE 9 MG/ML
30 INJECTION, SOLUTION INTRAVENOUS CONTINUOUS
Status: DISCONTINUED | OUTPATIENT
Start: 2020-02-12 | End: 2020-02-12 | Stop reason: HOSPADM

## 2020-02-12 RX ORDER — FENTANYL CITRATE 50 UG/ML
INJECTION, SOLUTION INTRAMUSCULAR; INTRAVENOUS AS NEEDED
Status: DISCONTINUED | OUTPATIENT
Start: 2020-02-12 | End: 2020-02-12 | Stop reason: HOSPADM

## 2020-02-12 RX ORDER — ASPIRIN 81 MG/1
324 TABLET, CHEWABLE ORAL ONCE
Status: DISCONTINUED | OUTPATIENT
Start: 2020-02-12 | End: 2020-02-12 | Stop reason: HOSPADM

## 2020-02-14 LAB
ARTERIAL PATENCY WRIST A: ABNORMAL
ATMOSPHERIC PRESS: ABNORMAL MM[HG]
BASE EXCESS BLDA CALC-SCNC: -0.3 MMOL/L (ref 0–3)
BDY SITE: ABNORMAL
CO2 BLDA-SCNC: 27 MMOL/L (ref 22–29)
HCO3 BLDA-SCNC: 25.6 MMOL/L (ref 21–28)
HEMODILUTION: NO
HOROWITZ INDEX BLD+IHG-RTO: 21 %
MODALITY: ABNORMAL
PCO2 BLDA: 46.1 MM HG (ref 35–48)
PH BLDA: 7.35 PH UNITS (ref 7.35–7.45)
PO2 BLDA: 37.7 MM HG (ref 83–108)
SAO2 % BLDCOA: 68.5 % (ref 94–98)

## 2020-02-26 PROCEDURE — 93010 ELECTROCARDIOGRAM REPORT: CPT | Performed by: INTERNAL MEDICINE

## 2020-04-23 ENCOUNTER — OFFICE VISIT (OUTPATIENT)
Dept: CARDIOLOGY | Facility: CLINIC | Age: 40
End: 2020-04-23

## 2020-04-23 ENCOUNTER — CLINICAL SUPPORT NO REQUIREMENTS (OUTPATIENT)
Dept: CARDIOLOGY | Facility: CLINIC | Age: 40
End: 2020-04-23

## 2020-04-23 VITALS — BODY MASS INDEX: 24.93 KG/M2 | WEIGHT: 143 LBS

## 2020-04-23 DIAGNOSIS — Z95.810 ICD (IMPLANTABLE CARDIOVERTER-DEFIBRILLATOR) IN PLACE: ICD-10-CM

## 2020-04-23 DIAGNOSIS — I50.22 CHRONIC SYSTOLIC CONGESTIVE HEART FAILURE (HCC): Primary | ICD-10-CM

## 2020-04-23 DIAGNOSIS — I34.0 NONRHEUMATIC MITRAL VALVE REGURGITATION: ICD-10-CM

## 2020-04-23 DIAGNOSIS — I42.9 CARDIOMYOPATHY, UNSPECIFIED TYPE (HCC): Primary | ICD-10-CM

## 2020-04-23 DIAGNOSIS — I38 VALVULAR HEART DISEASE: ICD-10-CM

## 2020-04-23 PROCEDURE — 99214 OFFICE O/P EST MOD 30 MIN: CPT | Performed by: INTERNAL MEDICINE

## 2020-04-23 NOTE — PROGRESS NOTES
In clinic device check. See scanned report. No episodes. Battery status OK. No changes made. Charges per Dr. Hawthorne's clinic.

## 2020-04-23 NOTE — PROGRESS NOTES
CC--cardiomyopathy, fatigue    Sub--39-year-old very pleasant patient with history of nonischemic cardiomyopathy underwent a single-chamber ICD implantation few years ago for primary prevention and came for follow.  She continues to have class II fatigue, dyspnea and atypical chest pain without any syncope.She is able to take low-dose of beta-blockers and ACE inhibitors and denies any active leg edema or any syncope or dizziness since last visit.Patient denies any palpitations.She is trying to stop smoking  Chronic medical problems include nonischemic cardiomyopathy, ICD in situ, noncompliance and tobacco abuse  Prior echocardiography showed EF improvement to 50% with moderate to severe mitral regurgitation--she underwent echocardiography back in January showing moderate to severe MR followed by left and right heart catheterization showing 2+ MR without any evidence of pulmonary hypertension and left to medical management  She has no new symptoms and came for follow-up    Past history reviewed below and unchanged    Past Medical History:   Diagnosis Date   • Alcohol abuse    • Cardiomyopathy (CMS/HCC)    • Cocaine abuse (CMS/HCC)    • Depression 2011   • Fatigue    • Hard of hearing    • History of LEEP (loop electrosurgical excision procedure) of cervix complicating pregnancy    • Kidney failure 2003    and 2001 with pregnancy   • Myocardial infarction (CMS/HCC)      Past Surgical History:   Procedure Laterality Date   • CARDIAC CATHETERIZATION     • CARDIAC CATHETERIZATION N/A 2/12/2020    Procedure: Right and Left Heart Cath;  Surgeon: Bro Marshall DO;  Location: [facility] CATH INVASIVE LOCATION;  Service: Cardiology;  Laterality: N/A;   • CARDIAC DEFIBRILLATOR PLACEMENT     • LAPAROSCOPIC TUBAL LIGATION  2003     Family History   Problem Relation Age of Onset   • Heart failure Mother    • Heart failure Daughter      Social History     Tobacco Use   • Smoking status: Current Every Day Smoker      Packs/day: 0.50   • Smokeless tobacco: Never Used   Substance Use Topics   • Alcohol use: No     Frequency: Never   • Drug use: Yes     Comment: 1 year      Allergies:  Adhesive tape and Sulfa antibiotics    Review of Systems   General:  positive for fatigue and tiredness  Eyes: No redness  Cardiovascular: No chest pain, no palpitations  Respiratory:   positive for class 2 shortness of breath  Gastrointestinal: No nausea or vomiting, bleeding  Genitourinary: no hematuria or dysuria  Musculoskeletal: No arthralgia or myalgia  Skin: No rash  Neurologic: No numbness, tingling, syncope  Hematologic/Lymphatic: No abnormal bleeding      Physical Exam  VITALS REVIEWED--pulse rate 74 , RR-12, AFEBRILE    General:      well developed, well nourished, in no acute distress.    Head:      normocephalic and atraumatic.    Eyes:      PERRL/EOM intact, conjunctiva and sclera clear with out nystagmus.    Neck:      no  thyromegaly,  trachea central with normal respiratory effort     Heart:       SINUS RHYTHM , ICD SITE IS CLEAN  Msk:      no deformity or scoliosis noted of thoracic or lumbar spine.    Extremities:       no cyanosis or clubbing--NO edema.    Neurologic:      no focal deficits.   alert oriented x3  Skin:      intact without lesions or rashes.    Psych:      alert and cooperative; normal mood and affect; normal attention span and concentration.        A/P  NON ISCHEMIC CARDIOMYOPATHY--EF normalized to 55 to 60%  CLASS 2 COMPENSATED CHF  TOBACCO ABUSE--tobacco cessation and alternate options  like patch and gum educated  ICD IN SITU WITH NORMAL FX--INTERROGATION ATTACHED  Moderate to severe mitral regurgitation without pulmonary hypertension--patient educated about the natural course of mitral regurgitation  Continue Coreg and lisinopril  Follow-up in 6 months    Electronically signed by Blayne Hawthorne MD, 04/23/20, 11:52 AM.

## 2020-07-21 ENCOUNTER — CLINICAL SUPPORT NO REQUIREMENTS (OUTPATIENT)
Dept: CARDIOLOGY | Facility: CLINIC | Age: 40
End: 2020-07-21

## 2020-07-21 DIAGNOSIS — I38 VALVULAR HEART DISEASE: ICD-10-CM

## 2020-07-21 DIAGNOSIS — I42.9 CARDIOMYOPATHY, UNSPECIFIED TYPE (HCC): Primary | ICD-10-CM

## 2020-07-21 PROCEDURE — 93295 DEV INTERROG REMOTE 1/2/MLT: CPT | Performed by: INTERNAL MEDICINE

## 2020-07-21 PROCEDURE — 93296 REM INTERROG EVL PM/IDS: CPT | Performed by: INTERNAL MEDICINE

## 2020-07-21 NOTE — PROGRESS NOTES
Remote report received and reviewed. See scanned report. No new episodes. Approximant time to explant 3 years.

## 2020-08-10 RX ORDER — CARVEDILOL 3.12 MG/1
3.12 TABLET ORAL 2 TIMES DAILY WITH MEALS
Qty: 90 TABLET | Refills: 1 | Status: SHIPPED | OUTPATIENT
Start: 2020-08-10 | End: 2020-10-29

## 2020-08-10 RX ORDER — ASPIRIN 325 MG
325 TABLET ORAL DAILY
Qty: 90 TABLET | Refills: 1 | Status: SHIPPED | OUTPATIENT
Start: 2020-08-10 | End: 2021-02-01

## 2020-08-12 RX ORDER — LISINOPRIL 2.5 MG/1
2.5 TABLET ORAL DAILY
Qty: 60 TABLET | Refills: 0 | Status: SHIPPED | OUTPATIENT
Start: 2020-08-12 | End: 2020-10-06

## 2020-10-06 RX ORDER — LISINOPRIL 2.5 MG/1
TABLET ORAL
Qty: 90 TABLET | Refills: 1 | Status: SHIPPED | OUTPATIENT
Start: 2020-10-06 | End: 2021-03-29

## 2020-10-23 ENCOUNTER — OFFICE VISIT (OUTPATIENT)
Dept: CARDIOLOGY | Facility: CLINIC | Age: 40
End: 2020-10-23

## 2020-10-23 ENCOUNTER — CLINICAL SUPPORT NO REQUIREMENTS (OUTPATIENT)
Dept: CARDIOLOGY | Facility: CLINIC | Age: 40
End: 2020-10-23

## 2020-10-23 VITALS
BODY MASS INDEX: 24.06 KG/M2 | WEIGHT: 138 LBS | SYSTOLIC BLOOD PRESSURE: 122 MMHG | HEART RATE: 75 BPM | OXYGEN SATURATION: 97 % | DIASTOLIC BLOOD PRESSURE: 76 MMHG

## 2020-10-23 DIAGNOSIS — I34.0 NONRHEUMATIC MITRAL VALVE REGURGITATION: ICD-10-CM

## 2020-10-23 DIAGNOSIS — I42.9 CARDIOMYOPATHY, UNSPECIFIED TYPE (HCC): Primary | ICD-10-CM

## 2020-10-23 DIAGNOSIS — I50.22 CHRONIC SYSTOLIC CONGESTIVE HEART FAILURE (HCC): Primary | ICD-10-CM

## 2020-10-23 DIAGNOSIS — I42.0 DILATED CARDIOMYOPATHY (HCC): ICD-10-CM

## 2020-10-23 DIAGNOSIS — Z95.810 ICD (IMPLANTABLE CARDIOVERTER-DEFIBRILLATOR) IN PLACE: ICD-10-CM

## 2020-10-23 PROCEDURE — 93282 PRGRMG EVAL IMPLANTABLE DFB: CPT | Performed by: INTERNAL MEDICINE

## 2020-10-23 PROCEDURE — 99214 OFFICE O/P EST MOD 30 MIN: CPT | Performed by: INTERNAL MEDICINE

## 2020-10-23 PROCEDURE — 93000 ELECTROCARDIOGRAM COMPLETE: CPT | Performed by: INTERNAL MEDICINE

## 2020-10-23 NOTE — PROGRESS NOTES
In clinic device interrogation. See scanned report. No new episodes. Battery ok. Charges per Dr. Hawthorne's clinic.

## 2020-10-23 NOTE — PROGRESS NOTES
CC--cardiomyopathy, fatigue    Sub--40-year-old very pleasant patient with history of nonischemic cardiomyopathy underwent a single-chamber ICD implantation few years ago for primary prevention and came for follow.  She is able to take low-dose of beta-blockers and ACE inhibitors and denies any active leg edema or any syncope or dizziness since last visit.Patient denies any palpitations.She is trying to stop smoking  Chronic medical problems include nonischemic cardiomyopathy, ICD in situ, noncompliance and tobacco abuse  Prior echocardiography showed EF improvement to 50% with moderate to severe mitral regurgitation--she underwent echocardiography back in January showing moderate to severe MR followed by left and right heart catheterization showing 2+ MR without any evidence of pulmonary hypertension and left to medical management  She has no new symptoms and came for follow-up  She is exercising regularly and feels much better and denies any chest pain or dizziness and she is compliant with her medications    Past history reviewed below and unchanged    Past Medical History:   Diagnosis Date   • Alcohol abuse    • Cardiomyopathy (CMS/HCC)    • Cocaine abuse (CMS/HCC)    • Depression 2011   • Fatigue    • Hard of hearing    • History of LEEP (loop electrosurgical excision procedure) of cervix complicating pregnancy    • Kidney failure 2003    and 2001 with pregnancy   • Myocardial infarction (CMS/HCC)      Past Surgical History:   Procedure Laterality Date   • CARDIAC CATHETERIZATION     • CARDIAC CATHETERIZATION N/A 2/12/2020    Procedure: Right and Left Heart Cath;  Surgeon: Bro Marshall DO;  Location: [facility] CATH INVASIVE LOCATION;  Service: Cardiology;  Laterality: N/A;   • CARDIAC DEFIBRILLATOR PLACEMENT     • LAPAROSCOPIC TUBAL LIGATION  2003     Review of Systems   General:  NO fatigue and tiredness  Eyes: No redness  Cardiovascular: No chest pain, no palpitations  Respiratory:   NO shortness  of breath  Gastrointestinal: No nausea or vomiting, bleeding  Genitourinary: no hematuria or dysuria  Musculoskeletal: No arthralgia or myalgia  Skin: No rash  Neurologic: No numbness, tingling, syncope  Hematologic/Lymphatic: No abnormal bleeding      Physical Exam  VITALS REVIEWED--pulse rate 74 , RR-12, AFEBRILE, blood pressure 122/76    General:      well developed, well nourished, in no acute distress.    Head:      normocephalic and atraumatic.    Eyes:      PERRL/EOM intact, conjunctiva and sclera clear with out nystagmus.    Neck:      no  thyromegaly,  trachea central with normal respiratory effort     Heart:       SINUS RHYTHM , ICD SITE IS CLEAN  Msk:      no deformity or scoliosis noted of thoracic or lumbar spine.    Extremities:       no cyanosis or clubbing--NO edema.    Neurologic:      no focal deficits.   alert oriented x3  Skin:      intact without lesions or rashes.    Psych:      alert and cooperative; normal mood and affect; normal attention span and concentration.        A/P  NON ISCHEMIC CARDIOMYOPATHY--EF normalized to 55 to 60%  CLASS 2 COMPENSATED CHF  TOBACCO ABUSE--tobacco cessation and alternate options  like patch and gum educated  ICD IN SITU WITH NORMAL FX--INTERROGATION ATTACHED  Moderate to severe mitral regurgitation without pulmonary hypertension--Continue Coreg and lisinopril  Follow-up in 6 months          ECG 12 Lead    Date/Time: 10/23/2020 10:42 AM  Performed by: Blayne Hawthorne MD  Authorized by: Blayne Hawthorne MD   Comparison: compared with previous ECG   Similar to previous ECG  Rhythm: sinus rhythm  Rate: normal  Conduction: conduction normal  Q waves: V1 and V2    QRS axis: right            Electronically signed by Blayne Hawthorne MD, 10/23/20, 10:42 AM EDT.

## 2020-10-29 RX ORDER — CARVEDILOL 3.12 MG/1
TABLET ORAL
Qty: 60 TABLET | Refills: 2 | Status: SHIPPED | OUTPATIENT
Start: 2020-10-29 | End: 2021-02-01

## 2021-02-01 RX ORDER — CARVEDILOL 3.12 MG/1
TABLET ORAL
Qty: 60 TABLET | Refills: 2 | Status: SHIPPED | OUTPATIENT
Start: 2021-02-01 | End: 2021-04-27

## 2021-02-01 RX ORDER — ASPIRIN 325 MG
TABLET ORAL
Qty: 30 TABLET | Refills: 5 | Status: ON HOLD | OUTPATIENT
Start: 2021-02-01 | End: 2023-01-25

## 2021-03-29 RX ORDER — LISINOPRIL 2.5 MG/1
TABLET ORAL
Qty: 30 TABLET | Refills: 5 | Status: SHIPPED | OUTPATIENT
Start: 2021-03-29 | End: 2021-09-10

## 2021-04-10 PROCEDURE — 93295 DEV INTERROG REMOTE 1/2/MLT: CPT | Performed by: INTERNAL MEDICINE

## 2021-04-10 PROCEDURE — 93296 REM INTERROG EVL PM/IDS: CPT | Performed by: INTERNAL MEDICINE

## 2021-04-26 ENCOUNTER — OFFICE VISIT (OUTPATIENT)
Dept: CARDIOLOGY | Facility: CLINIC | Age: 41
End: 2021-04-26

## 2021-04-26 VITALS
OXYGEN SATURATION: 95 % | SYSTOLIC BLOOD PRESSURE: 136 MMHG | BODY MASS INDEX: 24.06 KG/M2 | DIASTOLIC BLOOD PRESSURE: 82 MMHG | HEART RATE: 71 BPM | WEIGHT: 138 LBS

## 2021-04-26 DIAGNOSIS — I10 ESSENTIAL HYPERTENSION: ICD-10-CM

## 2021-04-26 DIAGNOSIS — I34.0 NONRHEUMATIC MITRAL VALVE REGURGITATION: Primary | ICD-10-CM

## 2021-04-26 DIAGNOSIS — I50.22 CHRONIC SYSTOLIC CONGESTIVE HEART FAILURE (HCC): ICD-10-CM

## 2021-04-26 DIAGNOSIS — Z95.810 ICD (IMPLANTABLE CARDIOVERTER-DEFIBRILLATOR) IN PLACE: ICD-10-CM

## 2021-04-26 PROCEDURE — 93000 ELECTROCARDIOGRAM COMPLETE: CPT | Performed by: INTERNAL MEDICINE

## 2021-04-26 PROCEDURE — 99213 OFFICE O/P EST LOW 20 MIN: CPT | Performed by: INTERNAL MEDICINE

## 2021-04-26 NOTE — PROGRESS NOTES
C--cardiomyopathy, fatigue    Sub--40-year-old very pleasant patient with history of nonischemic cardiomyopathy underwent a single-chamber ICD implantation few years ago for primary prevention and came for follow.  She is able to take low-dose of beta-blockers and ACE inhibitors and denies any active leg edema or any syncope or dizziness since last visit.Patient denies any palpitations.She is trying to stop smoking  Chronic medical problems include nonischemic cardiomyopathy, ICD in situ, noncompliance and tobacco abuse  Prior echocardiography showed EF improvement to 50% with moderate to severe mitral regurgitation--she underwent echocardiography back in January showing moderate to severe MR followed by left and right heart catheterization showing 2+ MR without any evidence of pulmonary hypertension and left to medical management  She has no new symptoms and came for follow-up  She is exercising regularly and feels much better and denies any chest pain or dizziness and she is compliant with her medications        Past Medical History:   Diagnosis Date   • Alcohol abuse    • Cardiomyopathy (CMS/HCC)    • Cocaine abuse (CMS/HCC)    • Depression 2011   • Fatigue    • Hard of hearing    • History of LEEP (loop electrosurgical excision procedure) of cervix complicating pregnancy    • Kidney failure 2003    and 2001 with pregnancy   • Myocardial infarction (CMS/HCC)      Past Surgical History:   Procedure Laterality Date   • CARDIAC CATHETERIZATION     • CARDIAC CATHETERIZATION N/A 2/12/2020    Procedure: Right and Left Heart Cath;  Surgeon: Bro Marshall DO;  Location: [facility] CATH INVASIVE LOCATION;  Service: Cardiology;  Laterality: N/A;   • CARDIAC DEFIBRILLATOR PLACEMENT     • LAPAROSCOPIC TUBAL LIGATION  2003           Physical Exam  VITALS REVIEWED  General:      well developed, well nourished, in no acute distress.    Head:      normocephalic and atraumatic.    Eyes:      PERRL/EOM intact,  conjunctiva and sclera clear with out nystagmus.    Neck:      no  thyromegaly,  trachea central with normal respiratory effort     Heart:       SINUS RHYTHM , ICD SITE IS CLEAN        A/P  NON ISCHEMIC CARDIOMYOPATHY--EF normalized to 55 to 60%  CLASS 2 COMPENSATED CHF  TOBACCO ABUSE--tobacco cessation and alternate options  like patch and gum educated  ICD IN SITU WITH NORMAL FX--INTERROGATION reviewed on home monitor  Moderate to severe mitral regurgitation without pulmonary hypertension--Continue Coreg and lisinopril  Follow-up in 6 months      ECG 12 Lead    Date/Time: 4/26/2021 4:08 PM  Performed by: Blayne Hawthorne MD  Authorized by: Blayne Hawthorne MD   Comparison: compared with previous ECG   Similar to previous ECG  Rhythm: sinus rhythm  Rate: normal  Other findings: non-specific ST-T wave changes            Electronically signed by Blayne Hawthorne MD, 04/26/21, 4:08 PM EDT.

## 2021-04-27 RX ORDER — CARVEDILOL 3.12 MG/1
TABLET ORAL
Qty: 60 TABLET | Refills: 2 | Status: SHIPPED | OUTPATIENT
Start: 2021-04-27 | End: 2021-08-02

## 2021-07-10 PROCEDURE — 93296 REM INTERROG EVL PM/IDS: CPT | Performed by: INTERNAL MEDICINE

## 2021-07-10 PROCEDURE — 93295 DEV INTERROG REMOTE 1/2/MLT: CPT | Performed by: INTERNAL MEDICINE

## 2021-08-02 RX ORDER — CARVEDILOL 3.12 MG/1
TABLET ORAL
Qty: 60 TABLET | Refills: 2 | Status: SHIPPED | OUTPATIENT
Start: 2021-08-02 | End: 2021-10-28 | Stop reason: SDUPTHER

## 2021-09-10 DIAGNOSIS — I10 ESSENTIAL HYPERTENSION: Primary | ICD-10-CM

## 2021-09-10 RX ORDER — LISINOPRIL 2.5 MG/1
TABLET ORAL
Qty: 30 TABLET | Refills: 5 | Status: SHIPPED | OUTPATIENT
Start: 2021-09-10 | End: 2021-10-28 | Stop reason: SDUPTHER

## 2021-10-09 PROCEDURE — 93296 REM INTERROG EVL PM/IDS: CPT | Performed by: INTERNAL MEDICINE

## 2021-10-09 PROCEDURE — 93295 DEV INTERROG REMOTE 1/2/MLT: CPT | Performed by: INTERNAL MEDICINE

## 2021-10-28 ENCOUNTER — OFFICE VISIT (OUTPATIENT)
Dept: CARDIOLOGY | Facility: CLINIC | Age: 41
End: 2021-10-28

## 2021-10-28 VITALS
WEIGHT: 137 LBS | DIASTOLIC BLOOD PRESSURE: 68 MMHG | BODY MASS INDEX: 25.21 KG/M2 | SYSTOLIC BLOOD PRESSURE: 117 MMHG | RESPIRATION RATE: 18 BRPM | HEIGHT: 62 IN | OXYGEN SATURATION: 99 % | HEART RATE: 84 BPM

## 2021-10-28 DIAGNOSIS — Z95.810 ICD (IMPLANTABLE CARDIOVERTER-DEFIBRILLATOR) IN PLACE: ICD-10-CM

## 2021-10-28 DIAGNOSIS — I34.0 NONRHEUMATIC MITRAL VALVE REGURGITATION: ICD-10-CM

## 2021-10-28 DIAGNOSIS — I42.9 CARDIOMYOPATHY, UNSPECIFIED TYPE (HCC): ICD-10-CM

## 2021-10-28 DIAGNOSIS — I10 ESSENTIAL HYPERTENSION: Primary | ICD-10-CM

## 2021-10-28 DIAGNOSIS — I38 VALVULAR HEART DISEASE: ICD-10-CM

## 2021-10-28 PROCEDURE — 99214 OFFICE O/P EST MOD 30 MIN: CPT | Performed by: INTERNAL MEDICINE

## 2021-10-28 PROCEDURE — 93000 ELECTROCARDIOGRAM COMPLETE: CPT | Performed by: INTERNAL MEDICINE

## 2021-10-28 RX ORDER — LISINOPRIL 2.5 MG/1
2.5 TABLET ORAL DAILY
Qty: 90 TABLET | Refills: 3 | Status: SHIPPED | OUTPATIENT
Start: 2021-10-28 | End: 2022-11-21

## 2021-10-28 RX ORDER — CARVEDILOL 3.12 MG/1
3.12 TABLET ORAL 2 TIMES DAILY WITH MEALS
Qty: 180 TABLET | Refills: 3 | Status: SHIPPED | OUTPATIENT
Start: 2021-10-28 | End: 2022-11-08

## 2021-10-28 RX ORDER — CYCLOBENZAPRINE HCL 5 MG
TABLET ORAL
COMMUNITY
Start: 2021-09-28 | End: 2022-03-28

## 2021-10-28 RX ORDER — BUPRENORPHINE HYDROCHLORIDE AND NALOXONE HYDROCHLORIDE DIHYDRATE 8; 2 MG/1; MG/1
TABLET SUBLINGUAL
COMMUNITY
Start: 2021-10-26 | End: 2022-11-26

## 2021-10-28 RX ORDER — ROPINIROLE 0.5 MG/1
TABLET, FILM COATED ORAL
COMMUNITY
Start: 2021-10-11 | End: 2022-03-28

## 2021-10-28 NOTE — PROGRESS NOTES
C--cardiomyopathy, fatigue    Sub--41-year-old very pleasant patient with history of nonischemic cardiomyopathy underwent a single-chamber ICD implantation few years ago for primary prevention and came for follow.  She is able to take low-dose of beta-blockers and ACE inhibitors and denies any active leg edema or any syncope or dizziness since last visit.Patient denies any palpitations.She is trying to stop smoking  Chronic medical problems include nonischemic cardiomyopathy, ICD in situ, noncompliance and tobacco abuse  Prior echocardiography showed EF improvement to 50% with moderate to severe mitral regurgitation--she underwent echocardiography back in January showing moderate to severe MR followed by left and right heart catheterization showing 2+ MR without any evidence of pulmonary hypertension and left to medical management  She has no new symptoms and came for follow-up  She is exercising regularly and feels much better and denies any chest pain or dizziness and she is compliant with her medications        Past Medical History:   Diagnosis Date   • Alcohol abuse    • Cardiomyopathy (CMS/HCC)    • Cocaine abuse (CMS/HCC)    • Depression 2011   • Fatigue    • Hard of hearing    • History of LEEP (loop electrosurgical excision procedure) of cervix complicating pregnancy    • Kidney failure 2003    and 2001 with pregnancy   • Myocardial infarction (CMS/HCC)      Past Surgical History:   Procedure Laterality Date   • CARDIAC CATHETERIZATION     • CARDIAC CATHETERIZATION N/A 2/12/2020    Procedure: Right and Left Heart Cath;  Surgeon: Bro Marshall DO;  Location: [facility] CATH INVASIVE LOCATION;  Service: Cardiology;  Laterality: N/A;   • CARDIAC DEFIBRILLATOR PLACEMENT     • LAPAROSCOPIC TUBAL LIGATION  2003           Physical Exam  VITALS REVIEWED  General:      well developed, well nourished, in no acute distress.    Head:      normocephalic and atraumatic.    Eyes:      PERRL/EOM intact,  conjunctiva and sclera clear with out nystagmus.    Neck:      no  thyromegaly,  trachea central with normal respiratory effort     Heart:       SINUS RHYTHM , ICD SITE IS CLEAN        A/P  NON ISCHEMIC CARDIOMYOPATHY--EF normalized to 55 to 60%  CLASS 2 COMPENSATED CHF  TOBACCO ABUSE--tobacco cessation and alternate options  like patch and gum educated--patient failed nicotine products including patch and wants to consider alternate options  ICD IN SITU WITH NORMAL FX--INTERROGATION reviewed on home monitor  Moderate to severe mitral regurgitation without pulmonary hypertension--Continue Coreg and lisinopril--refill for Coreg and lisinopril given  Follow-up in 6 months        ECG 12 Lead    Date/Time: 10/28/2021 5:12 PM  Performed by: Blayne Hawthorne MD  Authorized by: Blayne Hawthorne MD   Comparison: compared with previous ECG   Similar to previous ECG  Rhythm: sinus rhythm  Rate: normal  Conduction: conduction normal  QRS axis: right  Other findings: left atrial abnormality          Electronically signed by Blayne Hawthorne MD, 10/28/21, 5:11 PM EDT.

## 2022-01-08 PROCEDURE — 93296 REM INTERROG EVL PM/IDS: CPT | Performed by: INTERNAL MEDICINE

## 2022-01-08 PROCEDURE — 93295 DEV INTERROG REMOTE 1/2/MLT: CPT | Performed by: INTERNAL MEDICINE

## 2022-03-28 PROCEDURE — 87086 URINE CULTURE/COLONY COUNT: CPT | Performed by: FAMILY MEDICINE

## 2022-04-07 ENCOUNTER — OFFICE VISIT (OUTPATIENT)
Dept: CARDIOLOGY | Facility: CLINIC | Age: 42
End: 2022-04-07

## 2022-04-07 VITALS
HEIGHT: 62 IN | OXYGEN SATURATION: 98 % | SYSTOLIC BLOOD PRESSURE: 117 MMHG | WEIGHT: 118 LBS | BODY MASS INDEX: 21.71 KG/M2 | HEART RATE: 66 BPM | DIASTOLIC BLOOD PRESSURE: 67 MMHG

## 2022-04-07 DIAGNOSIS — I38 VALVULAR HEART DISEASE: ICD-10-CM

## 2022-04-07 DIAGNOSIS — I42.0 DILATED CARDIOMYOPATHY: ICD-10-CM

## 2022-04-07 DIAGNOSIS — I10 ESSENTIAL HYPERTENSION: ICD-10-CM

## 2022-04-07 DIAGNOSIS — I34.0 NONRHEUMATIC MITRAL VALVE REGURGITATION: Primary | ICD-10-CM

## 2022-04-07 DIAGNOSIS — Z95.810 ICD (IMPLANTABLE CARDIOVERTER-DEFIBRILLATOR) IN PLACE: ICD-10-CM

## 2022-04-07 DIAGNOSIS — I50.22 CHRONIC SYSTOLIC CONGESTIVE HEART FAILURE: ICD-10-CM

## 2022-04-07 PROCEDURE — 93282 PRGRMG EVAL IMPLANTABLE DFB: CPT | Performed by: INTERNAL MEDICINE

## 2022-04-07 PROCEDURE — 99214 OFFICE O/P EST MOD 30 MIN: CPT | Performed by: INTERNAL MEDICINE

## 2022-04-07 NOTE — PROGRESS NOTES
C--cardiomyopathy, fatigue    Sub--41-year-old very pleasant patient with history of nonischemic cardiomyopathy underwent a single-chamber ICD implantation few years ago for primary prevention and came for follow.  She is able to take low-dose of beta-blockers and ACE inhibitors and denies any active leg edema or any syncope or dizziness since last visit.Patient denies any palpitations.She is trying to stop smoking  Chronic medical problems include nonischemic cardiomyopathy, ICD in situ, noncompliance and tobacco abuse  Prior echocardiography showed EF improvement to 50% with moderate to severe mitral regurgitation--she underwent echocardiography back in January showing moderate to severe MR followed by left and right heart catheterization showing 2+ MR without any evidence of pulmonary hypertension and left to medical management  She has no new symptoms and came for follow-up  She is exercising regularly and feels much better and denies any chest pain or dizziness and she is compliant with her medications  Patient complains of possible ICD shock and also complains of distal digital discomfort particularly at nighttime without claudication.        Past Medical History:   Diagnosis Date   • Alcohol abuse    • Cardiomyopathy (CMS/HCC)    • Cocaine abuse (CMS/HCC)    • Depression 2011   • Fatigue    • Hard of hearing    • History of LEEP (loop electrosurgical excision procedure) of cervix complicating pregnancy    • Kidney failure 2003    and 2001 with pregnancy   • Myocardial infarction (CMS/HCC)      Past Surgical History:   Procedure Laterality Date   • CARDIAC CATHETERIZATION     • CARDIAC CATHETERIZATION N/A 2/12/2020    Procedure: Right and Left Heart Cath;  Surgeon: Bro Marshall DO;  Location: [facility] CATH INVASIVE LOCATION;  Service: Cardiology;  Laterality: N/A;   • CARDIAC DEFIBRILLATOR PLACEMENT     • LAPAROSCOPIC TUBAL LIGATION  2003           Physical Exam  VITALS REVIEWED  General:       well developed, well nourished, in no acute distress.    Head:      normocephalic and atraumatic.    Eyes:      PERRL/EOM intact, conjunctiva and sclera clear with out nystagmus.    Neck:      no  thyromegaly,  trachea central with normal respiratory effort     Heart:       SINUS RHYTHM , ICD SITE IS CLEAN        A/P  Distal digital discomfort with reduced circulation with a normal posterior tibialis on palpation suggestive of distal digital ischemia.  Patient strongly recommended complete smoking cessation.  Phantom ICD shock and ICD interrogation without any events and patient reassured  NON ISCHEMIC CARDIOMYOPATHY--EF normalized to 55 to 60%  CLASS 2 COMPENSATED CHF  TOBACCO ABUSE--tobacco cessation and alternate options  like patch and gum educated--patient failed nicotine products including patch and wants to consider alternate options  ICD IN SITU WITH NORMAL FX--INTERROGATION reviewed on home monitor  Moderate to severe mitral regurgitation without pulmonary hypertension--Continue Coreg and lisinopril  Patient is euvolemic on exam  Follow-up in 6 months      ECG 12 Lead    Date/Time: 4/7/2022 9:49 AM  Performed by: Blayne Hawthorne MD  Authorized by: Blayne Hawthorne MD   Comparison: compared with previous ECG   Similar to previous ECG  Rhythm: sinus rhythm  Rate: normal  Conduction: conduction normal  Q waves: V1, V2 and V3    QRS axis: normal  Other findings: left atrial abnormality          Electronically signed by Blayne Hawthorne MD, 04/07/22, 9:48 AM EDT.

## 2022-04-09 PROCEDURE — 93295 DEV INTERROG REMOTE 1/2/MLT: CPT | Performed by: INTERNAL MEDICINE

## 2022-04-09 PROCEDURE — 93296 REM INTERROG EVL PM/IDS: CPT | Performed by: INTERNAL MEDICINE

## 2022-10-08 PROCEDURE — 93296 REM INTERROG EVL PM/IDS: CPT | Performed by: INTERNAL MEDICINE

## 2022-10-08 PROCEDURE — 93295 DEV INTERROG REMOTE 1/2/MLT: CPT | Performed by: INTERNAL MEDICINE

## 2022-10-10 ENCOUNTER — OFFICE VISIT (OUTPATIENT)
Dept: CARDIOLOGY | Facility: CLINIC | Age: 42
End: 2022-10-10

## 2022-10-10 VITALS
HEIGHT: 62 IN | WEIGHT: 115 LBS | HEART RATE: 80 BPM | SYSTOLIC BLOOD PRESSURE: 122 MMHG | DIASTOLIC BLOOD PRESSURE: 72 MMHG | BODY MASS INDEX: 21.16 KG/M2 | OXYGEN SATURATION: 96 %

## 2022-10-10 DIAGNOSIS — I38 VALVULAR HEART DISEASE: ICD-10-CM

## 2022-10-10 DIAGNOSIS — I34.0 NONRHEUMATIC MITRAL VALVE REGURGITATION: Primary | ICD-10-CM

## 2022-10-10 DIAGNOSIS — I42.0 DILATED CARDIOMYOPATHY: ICD-10-CM

## 2022-10-10 DIAGNOSIS — I50.22 CHRONIC SYSTOLIC CONGESTIVE HEART FAILURE: ICD-10-CM

## 2022-10-10 DIAGNOSIS — Z95.810 ICD (IMPLANTABLE CARDIOVERTER-DEFIBRILLATOR) IN PLACE: ICD-10-CM

## 2022-10-10 PROCEDURE — 99214 OFFICE O/P EST MOD 30 MIN: CPT | Performed by: INTERNAL MEDICINE

## 2022-10-10 PROCEDURE — 93000 ELECTROCARDIOGRAM COMPLETE: CPT | Performed by: INTERNAL MEDICINE

## 2022-10-10 RX ORDER — LAMOTRIGINE 25 MG/1
25 TABLET ORAL
COMMUNITY
Start: 2022-08-04 | End: 2022-11-26

## 2022-10-10 RX ORDER — DEXTROAMPHETAMINE SULFATE 5 MG/1
TABLET ORAL
Status: ON HOLD | COMMUNITY
Start: 2022-07-20 | End: 2023-01-25

## 2022-10-10 RX ORDER — GLECAPREVIR AND PIBRENTASVIR 40; 100 MG/1; MG/1
3 TABLET, FILM COATED ORAL DAILY
Status: ON HOLD | COMMUNITY
Start: 2022-08-31 | End: 2023-01-25

## 2022-10-10 RX ORDER — ASPIRIN 81 MG/1
81 TABLET, CHEWABLE ORAL DAILY
COMMUNITY
End: 2022-10-10

## 2022-10-10 RX ORDER — DEXTROAMPHETAMINE SACCHARATE, AMPHETAMINE ASPARTATE, DEXTROAMPHETAMINE SULFATE AND AMPHETAMINE SULFATE 1.25; 1.25; 1.25; 1.25 MG/1; MG/1; MG/1; MG/1
10 TABLET ORAL DAILY
Qty: 60 TABLET | Refills: 0 | COMMUNITY
Start: 2022-09-23 | End: 2022-10-23

## 2022-10-10 NOTE — PROGRESS NOTES
CC--cardiomyopathy, fatigue      Sub  42-year-old pleasant patient with history of nonischemic cardiomyopathy and ICD comes in for follow-up.  She has mitral regurgitation without evidence of pulm hypertension left to medical management in the past.  Currently on low-dose of beta-blockers and ACE inhibitor's for cardiomyopathy.  Dx with Hep C on RX      My previous history is attached below for reference only which has been reviewed    Sub--41-year-old very pleasant patient with history of nonischemic cardiomyopathy underwent a single-chamber ICD implantation few years ago for primary prevention and came for follow.  She is able to take low-dose of beta-blockers and ACE inhibitors and denies any active leg edema or any syncope or dizziness since last visit.Patient denies any palpitations.She is trying to stop smoking  Chronic medical problems include nonischemic cardiomyopathy, ICD in situ, noncompliance and tobacco abuse  Prior echocardiography showed EF improvement to 50% with moderate to severe mitral regurgitation--she underwent echocardiography back in January showing moderate to severe MR followed by left and right heart catheterization showing 2+ MR without any evidence of pulmonary hypertension and left to medical management  She has no new symptoms and came for follow-up  She is exercising regularly and feels much better and denies any chest pain or dizziness and she is compliant with her medications  Patient complains of possible ICD shock and also complains of distal digital discomfort particularly at nighttime without claudication.        Past Medical History:   Diagnosis Date   • Alcohol abuse    • Cardiomyopathy (CMS/HCC)    • Cocaine abuse (CMS/HCC)    • Depression 2011   • Fatigue    • Hard of hearing    • History of LEEP (loop electrosurgical excision procedure) of cervix complicating pregnancy    • Kidney failure 2003    and 2001 with pregnancy   • Myocardial infarction (CMS/HCC)      Past Surgical  History:   Procedure Laterality Date   • CARDIAC CATHETERIZATION     • CARDIAC CATHETERIZATION N/A 2/12/2020    Procedure: Right and Left Heart Cath;  Surgeon: Bro Marshall DO;  Location: [facility] CATH INVASIVE LOCATION;  Service: Cardiology;  Laterality: N/A;   • CARDIAC DEFIBRILLATOR PLACEMENT     • LAPAROSCOPIC TUBAL LIGATION  2003         Physical Exam    General:      well developed, well nourished, in no acute distress.    Head:      normocephalic and atraumatic.    Eyes:      PERRL/EOM intact, conjunctivae and sclerae clear without nystagmus.    Neck:      no  thyromegaly, trachea central with normal respiratory effort  Lungs:      clear bilaterally to auscultation.    Heart:       regular rate and rhythm, S1, S2 without murmurs, rubs, or gallops  Skin:      intact without lesions or rashes.    Psych:      alert and cooperative; normal mood and affect; normal attention span and concentration.      A/P  Most recent potassium of 3.9 with normal creatinine  NON ISCHEMIC CARDIOMYOPATHY--EF normalized to 55 to 60%  CLASS 2 COMPENSATED CHF  TOBACCO ABUSE--tobacco cessation and alternate options  like patch and gum educated--patient failed nicotine products including patch and wants to consider alternate options  ICD IN SITU WITH NORMAL FX--INTERROGATION reviewed on home monitor  Moderate to severe mitral regurgitation without pulmonary hypertension--Continue Coreg and lisinopril  Patient is euvolemic on exam  Follow-up in 6 months  Hep C on RX      ECG 12 Lead    Date/Time: 10/10/2022 3:39 PM  Performed by: Blayne Hawthorne MD  Authorized by: Blayne Hawthorne MD   Comparison: compared with previous ECG   Similar to previous ECG  Rhythm: sinus rhythm  Rate: normal  Conduction: conduction normal              Electronically signed by Blayne Hawthorne MD, 10/10/22, 3:38 PM EDT.

## 2022-11-08 RX ORDER — CARVEDILOL 3.12 MG/1
TABLET ORAL
Qty: 60 TABLET | Refills: 11 | Status: ON HOLD | OUTPATIENT
Start: 2022-11-08 | End: 2023-01-25

## 2022-11-21 DIAGNOSIS — I10 ESSENTIAL HYPERTENSION: ICD-10-CM

## 2022-11-21 RX ORDER — LISINOPRIL 2.5 MG/1
TABLET ORAL
Qty: 90 TABLET | Refills: 3 | Status: ON HOLD | OUTPATIENT
Start: 2022-11-21 | End: 2023-01-25

## 2023-01-03 ENCOUNTER — TELEPHONE (OUTPATIENT)
Dept: CARDIOLOGY | Facility: CLINIC | Age: 43
End: 2023-01-03
Payer: COMMERCIAL

## 2023-01-03 NOTE — TELEPHONE ENCOUNTER
Upon review of Murj report today it appears that the battery reached RRT on 12/30/22. Please follow up with patient to schedule replacement of generator. Thanks [email or web address]

## 2023-01-05 ENCOUNTER — PREP FOR SURGERY (OUTPATIENT)
Dept: OTHER | Facility: HOSPITAL | Age: 43
End: 2023-01-05
Payer: COMMERCIAL

## 2023-01-05 ENCOUNTER — OFFICE VISIT (OUTPATIENT)
Dept: CARDIOLOGY | Facility: CLINIC | Age: 43
End: 2023-01-05
Payer: COMMERCIAL

## 2023-01-05 VITALS
HEART RATE: 65 BPM | HEIGHT: 62 IN | WEIGHT: 125 LBS | SYSTOLIC BLOOD PRESSURE: 148 MMHG | BODY MASS INDEX: 23 KG/M2 | DIASTOLIC BLOOD PRESSURE: 78 MMHG | OXYGEN SATURATION: 99 %

## 2023-01-05 DIAGNOSIS — I38 VALVULAR HEART DISEASE: ICD-10-CM

## 2023-01-05 DIAGNOSIS — I42.0 DILATED CARDIOMYOPATHY: ICD-10-CM

## 2023-01-05 DIAGNOSIS — Z45.02 ICD (IMPLANTABLE CARDIOVERTER-DEFIBRILLATOR) BATTERY DEPLETION: ICD-10-CM

## 2023-01-05 DIAGNOSIS — I50.22 CHRONIC SYSTOLIC CONGESTIVE HEART FAILURE: ICD-10-CM

## 2023-01-05 DIAGNOSIS — I34.0 NONRHEUMATIC MITRAL VALVE REGURGITATION: Primary | ICD-10-CM

## 2023-01-05 PROCEDURE — 99214 OFFICE O/P EST MOD 30 MIN: CPT | Performed by: INTERNAL MEDICINE

## 2023-01-05 PROCEDURE — 93000 ELECTROCARDIOGRAM COMPLETE: CPT | Performed by: INTERNAL MEDICINE

## 2023-01-05 RX ORDER — SODIUM CHLORIDE 0.9 % (FLUSH) 0.9 %
3 SYRINGE (ML) INJECTION EVERY 12 HOURS SCHEDULED
Status: CANCELLED | OUTPATIENT
Start: 2023-01-05

## 2023-01-05 RX ORDER — SODIUM CHLORIDE 9 MG/ML
40 INJECTION, SOLUTION INTRAVENOUS AS NEEDED
Status: CANCELLED | OUTPATIENT
Start: 2023-01-05

## 2023-01-05 RX ORDER — SODIUM CHLORIDE 0.9 % (FLUSH) 0.9 %
3-10 SYRINGE (ML) INJECTION AS NEEDED
Status: CANCELLED | OUTPATIENT
Start: 2023-01-05

## 2023-01-05 NOTE — H&P (VIEW-ONLY)
CC--cardiomyopathy, fatigue      Sub  42-year-old female patient has single-chamber ICD and ICD has reduced battery depletion notified by home monitoring and patient brought to the office.  No new symptoms.  She does have history of nonischemic dilated cardiomyopathy with ICD implantation in 2015 and has history of moderate to severe MR which was checked on a heart catheterization showing 2+ MR in 2020.  She also has hepatitis C on treatment.      My previous history is attached below for reference only which has been reviewed    Sub--41-year-old very pleasant patient with history of nonischemic cardiomyopathy underwent a single-chamber ICD implantation few years ago for primary prevention and came for follow.  She is able to take low-dose of beta-blockers and ACE inhibitors and denies any active leg edema or any syncope or dizziness since last visit.Patient denies any palpitations.She is trying to stop smoking  Chronic medical problems include nonischemic cardiomyopathy, ICD in situ, noncompliance and tobacco abuse  Prior echocardiography showed EF improvement to 50% with moderate to severe mitral regurgitation--she underwent echocardiography back in January showing moderate to severe MR followed by left and right heart catheterization showing 2+ MR without any evidence of pulmonary hypertension and left to medical management  She has no new symptoms and came for follow-up  She is exercising regularly and feels much better and denies any chest pain or dizziness and she is compliant with her medications  Patient complains of possible ICD shock and also complains of distal digital discomfort particularly at nighttime without claudication.        Past Medical History:   Diagnosis Date   • Alcohol abuse    • Cardiomyopathy (CMS/HCC)    • Cocaine abuse (CMS/HCC)    • Depression 2011   • Fatigue    • Hard of hearing    • History of LEEP (loop electrosurgical excision procedure) of cervix complicating pregnancy    •  Kidney failure 2003    and 2001 with pregnancy   • Myocardial infarction (CMS/HCC)      Past Surgical History:   Procedure Laterality Date   • CARDIAC CATHETERIZATION     • CARDIAC CATHETERIZATION N/A 2/12/2020    Procedure: Right and Left Heart Cath;  Surgeon: Bro Marshall DO;  Location: [facility] CATH INVASIVE LOCATION;  Service: Cardiology;  Laterality: N/A;   • CARDIAC DEFIBRILLATOR PLACEMENT     • LAPAROSCOPIC TUBAL LIGATION  2003       Physical Exam    General:      well developed, well nourished, in no acute distress.    Head:      normocephalic and atraumatic.    Eyes:      PERRL/EOM intact, conjunctivae and sclerae clear without nystagmus.    Neck:      no  thyromegaly, trachea central with normal respiratory effort  Lungs:      clear bilaterally to auscultation.    Heart:       regular rate and rhythm, S1, S2 without murmurs, rubs, or gallops  Skin:      intact without lesions or rashes.    Psych:      alert and cooperative; normal mood and affect; normal attention span and concentration.      A/P    ICD home monitoring here revealed battery depletion and ICD generator change discussed with the patient and orders placed.  Risks and benefits and outcomes educated.  Nonischemic cardiomyopathy with EF improvement over 50%  Compensated class II congestive heart failure  Moderate MR  Tobacco abuse and patient again educated about smoking cessation  Hepatitis C on therapy        ECG 12 Lead    Date/Time: 1/5/2023 12:48 PM  Performed by: Blayne Hawthorne MD  Authorized by: Blayne Hawthorne MD   Comparison: compared with previous ECG   Similar to previous ECG  Rhythm: sinus rhythm  Rate: normal  Conduction: conduction normal  QRS axis: right              Electronically signed by Blayne Hawthorne MD, 01/05/23, 12:48 PM EST.

## 2023-01-05 NOTE — PROGRESS NOTES
CC--cardiomyopathy, fatigue      Sub  42-year-old female patient has single-chamber ICD and ICD has reduced battery depletion notified by home monitoring and patient brought to the office.  No new symptoms.  She does have history of nonischemic dilated cardiomyopathy with ICD implantation in 2015 and has history of moderate to severe MR which was checked on a heart catheterization showing 2+ MR in 2020.  She also has hepatitis C on treatment.      My previous history is attached below for reference only which has been reviewed    Sub--41-year-old very pleasant patient with history of nonischemic cardiomyopathy underwent a single-chamber ICD implantation few years ago for primary prevention and came for follow.  She is able to take low-dose of beta-blockers and ACE inhibitors and denies any active leg edema or any syncope or dizziness since last visit.Patient denies any palpitations.She is trying to stop smoking  Chronic medical problems include nonischemic cardiomyopathy, ICD in situ, noncompliance and tobacco abuse  Prior echocardiography showed EF improvement to 50% with moderate to severe mitral regurgitation--she underwent echocardiography back in January showing moderate to severe MR followed by left and right heart catheterization showing 2+ MR without any evidence of pulmonary hypertension and left to medical management  She has no new symptoms and came for follow-up  She is exercising regularly and feels much better and denies any chest pain or dizziness and she is compliant with her medications  Patient complains of possible ICD shock and also complains of distal digital discomfort particularly at nighttime without claudication.        Past Medical History:   Diagnosis Date   • Alcohol abuse    • Cardiomyopathy (CMS/HCC)    • Cocaine abuse (CMS/HCC)    • Depression 2011   • Fatigue    • Hard of hearing    • History of LEEP (loop electrosurgical excision procedure) of cervix complicating pregnancy    •  Kidney failure 2003    and 2001 with pregnancy   • Myocardial infarction (CMS/HCC)      Past Surgical History:   Procedure Laterality Date   • CARDIAC CATHETERIZATION     • CARDIAC CATHETERIZATION N/A 2/12/2020    Procedure: Right and Left Heart Cath;  Surgeon: Bro Marshall DO;  Location: [facility] CATH INVASIVE LOCATION;  Service: Cardiology;  Laterality: N/A;   • CARDIAC DEFIBRILLATOR PLACEMENT     • LAPAROSCOPIC TUBAL LIGATION  2003       Physical Exam    General:      well developed, well nourished, in no acute distress.    Head:      normocephalic and atraumatic.    Eyes:      PERRL/EOM intact, conjunctivae and sclerae clear without nystagmus.    Neck:      no  thyromegaly, trachea central with normal respiratory effort  Lungs:      clear bilaterally to auscultation.    Heart:       regular rate and rhythm, S1, S2 without murmurs, rubs, or gallops  Skin:      intact without lesions or rashes.    Psych:      alert and cooperative; normal mood and affect; normal attention span and concentration.      A/P    ICD home monitoring here revealed battery depletion and ICD generator change discussed with the patient and orders placed.  Risks and benefits and outcomes educated.  Nonischemic cardiomyopathy with EF improvement over 50%  Compensated class II congestive heart failure  Moderate MR  Tobacco abuse and patient again educated about smoking cessation  Hepatitis C on therapy        ECG 12 Lead    Date/Time: 1/5/2023 12:48 PM  Performed by: Blayne Hawthorne MD  Authorized by: Blayne Hawthorne MD   Comparison: compared with previous ECG   Similar to previous ECG  Rhythm: sinus rhythm  Rate: normal  Conduction: conduction normal  QRS axis: right              Electronically signed by Blayne Hawthorne MD, 01/05/23, 12:48 PM EST.

## 2023-01-06 PROBLEM — Z45.02 ICD (IMPLANTABLE CARDIOVERTER-DEFIBRILLATOR) BATTERY DEPLETION: Status: ACTIVE | Noted: 2023-01-06

## 2023-01-07 PROCEDURE — 93296 REM INTERROG EVL PM/IDS: CPT | Performed by: INTERNAL MEDICINE

## 2023-01-07 PROCEDURE — 93295 DEV INTERROG REMOTE 1/2/MLT: CPT | Performed by: INTERNAL MEDICINE

## 2023-01-23 ENCOUNTER — LAB (OUTPATIENT)
Dept: LAB | Facility: HOSPITAL | Age: 43
End: 2023-01-23
Payer: COMMERCIAL

## 2023-01-23 DIAGNOSIS — Z45.02 ICD (IMPLANTABLE CARDIOVERTER-DEFIBRILLATOR) BATTERY DEPLETION: ICD-10-CM

## 2023-01-23 DIAGNOSIS — I42.0 DILATED CARDIOMYOPATHY: ICD-10-CM

## 2023-01-23 DIAGNOSIS — I34.0 NONRHEUMATIC MITRAL VALVE REGURGITATION: ICD-10-CM

## 2023-01-23 LAB
ALBUMIN SERPL-MCNC: 4.1 G/DL (ref 3.5–5.2)
ALBUMIN/GLOB SERPL: 1.4 G/DL
ALP SERPL-CCNC: 62 U/L (ref 39–117)
ALT SERPL W P-5'-P-CCNC: 17 U/L (ref 1–33)
ANION GAP SERPL CALCULATED.3IONS-SCNC: 9.2 MMOL/L (ref 5–15)
AST SERPL-CCNC: 21 U/L (ref 1–32)
BASOPHILS # BLD AUTO: 0.05 10*3/MM3 (ref 0–0.2)
BASOPHILS NFR BLD AUTO: 0.9 % (ref 0–1.5)
BILIRUB SERPL-MCNC: 0.3 MG/DL (ref 0–1.2)
BUN SERPL-MCNC: 17 MG/DL (ref 6–20)
BUN/CREAT SERPL: 23.3 (ref 7–25)
CALCIUM SPEC-SCNC: 9.4 MG/DL (ref 8.6–10.5)
CHLORIDE SERPL-SCNC: 100 MMOL/L (ref 98–107)
CO2 SERPL-SCNC: 27.8 MMOL/L (ref 22–29)
CREAT SERPL-MCNC: 0.73 MG/DL (ref 0.57–1)
DEPRECATED RDW RBC AUTO: 40.7 FL (ref 37–54)
EGFRCR SERPLBLD CKD-EPI 2021: 104.8 ML/MIN/1.73
EOSINOPHIL # BLD AUTO: 0.28 10*3/MM3 (ref 0–0.4)
EOSINOPHIL NFR BLD AUTO: 5 % (ref 0.3–6.2)
ERYTHROCYTE [DISTWIDTH] IN BLOOD BY AUTOMATED COUNT: 11.6 % (ref 12.3–15.4)
GLOBULIN UR ELPH-MCNC: 3 GM/DL
GLUCOSE SERPL-MCNC: 90 MG/DL (ref 65–99)
HCT VFR BLD AUTO: 39.8 % (ref 34–46.6)
HGB BLD-MCNC: 13.5 G/DL (ref 12–15.9)
IMM GRANULOCYTES # BLD AUTO: 0.01 10*3/MM3 (ref 0–0.05)
IMM GRANULOCYTES NFR BLD AUTO: 0.2 % (ref 0–0.5)
LYMPHOCYTES # BLD AUTO: 2.25 10*3/MM3 (ref 0.7–3.1)
LYMPHOCYTES NFR BLD AUTO: 39.9 % (ref 19.6–45.3)
MAGNESIUM SERPL-MCNC: 1.9 MG/DL (ref 1.6–2.6)
MCH RBC QN AUTO: 32.7 PG (ref 26.6–33)
MCHC RBC AUTO-ENTMCNC: 33.9 G/DL (ref 31.5–35.7)
MCV RBC AUTO: 96.4 FL (ref 79–97)
MONOCYTES # BLD AUTO: 0.57 10*3/MM3 (ref 0.1–0.9)
MONOCYTES NFR BLD AUTO: 10.1 % (ref 5–12)
NEUTROPHILS NFR BLD AUTO: 2.48 10*3/MM3 (ref 1.7–7)
NEUTROPHILS NFR BLD AUTO: 43.9 % (ref 42.7–76)
NRBC BLD AUTO-RTO: 0 /100 WBC (ref 0–0.2)
PLATELET # BLD AUTO: 348 10*3/MM3 (ref 140–450)
PMV BLD AUTO: 9.3 FL (ref 6–12)
POTASSIUM SERPL-SCNC: 4.6 MMOL/L (ref 3.5–5.2)
PROT SERPL-MCNC: 7.1 G/DL (ref 6–8.5)
RBC # BLD AUTO: 4.13 10*6/MM3 (ref 3.77–5.28)
SODIUM SERPL-SCNC: 137 MMOL/L (ref 136–145)
WBC NRBC COR # BLD: 5.64 10*3/MM3 (ref 3.4–10.8)

## 2023-01-23 PROCEDURE — 85025 COMPLETE CBC W/AUTO DIFF WBC: CPT

## 2023-01-23 PROCEDURE — 36415 COLL VENOUS BLD VENIPUNCTURE: CPT

## 2023-01-23 PROCEDURE — 83735 ASSAY OF MAGNESIUM: CPT

## 2023-01-23 PROCEDURE — 80053 COMPREHEN METABOLIC PANEL: CPT

## 2023-01-25 ENCOUNTER — ANESTHESIA EVENT (OUTPATIENT)
Dept: CARDIOLOGY | Facility: HOSPITAL | Age: 43
End: 2023-01-25
Payer: COMMERCIAL

## 2023-01-25 ENCOUNTER — HOSPITAL ENCOUNTER (OUTPATIENT)
Facility: HOSPITAL | Age: 43
Setting detail: HOSPITAL OUTPATIENT SURGERY
Discharge: HOME | End: 2023-01-25
Attending: INTERNAL MEDICINE | Admitting: INTERNAL MEDICINE
Payer: COMMERCIAL

## 2023-01-25 ENCOUNTER — ANESTHESIA (OUTPATIENT)
Dept: CARDIOLOGY | Facility: HOSPITAL | Age: 43
End: 2023-01-25
Payer: COMMERCIAL

## 2023-01-25 VITALS
SYSTOLIC BLOOD PRESSURE: 114 MMHG | HEIGHT: 64 IN | WEIGHT: 122.58 LBS | DIASTOLIC BLOOD PRESSURE: 58 MMHG | HEART RATE: 66 BPM | RESPIRATION RATE: 15 BRPM | OXYGEN SATURATION: 96 % | BODY MASS INDEX: 20.93 KG/M2 | TEMPERATURE: 98.3 F

## 2023-01-25 DIAGNOSIS — Z45.02 ICD (IMPLANTABLE CARDIOVERTER-DEFIBRILLATOR) BATTERY DEPLETION: ICD-10-CM

## 2023-01-25 DIAGNOSIS — I34.0 NONRHEUMATIC MITRAL VALVE REGURGITATION: ICD-10-CM

## 2023-01-25 DIAGNOSIS — I42.0 DILATED CARDIOMYOPATHY: ICD-10-CM

## 2023-01-25 PROCEDURE — 25010000002 PROPOFOL 10 MG/ML EMULSION: Performed by: NURSE ANESTHETIST, CERTIFIED REGISTERED

## 2023-01-25 PROCEDURE — C1722 AICD, SINGLE CHAMBER: HCPCS | Performed by: INTERNAL MEDICINE

## 2023-01-25 PROCEDURE — 33262 RMVL& REPLC PULSE GEN 1 LEAD: CPT | Performed by: INTERNAL MEDICINE

## 2023-01-25 PROCEDURE — 25010000002 CEFAZOLIN PER 500 MG: Performed by: INTERNAL MEDICINE

## 2023-01-25 PROCEDURE — 25010000002 MIDAZOLAM PER 1 MG: Performed by: NURSE ANESTHETIST, CERTIFIED REGISTERED

## 2023-01-25 DEVICE — IMPLANTABLE CARDIOVERTER DEFIBRILLATOR VR
Type: IMPLANTABLE DEVICE | Status: FUNCTIONAL
Brand: DYNAGEN™ MINI ICD VR

## 2023-01-25 RX ORDER — CARVEDILOL 3.12 MG/1
6.25 TABLET ORAL NIGHTLY
COMMUNITY

## 2023-01-25 RX ORDER — CEPHALEXIN 500 MG/1
500 CAPSULE ORAL 3 TIMES DAILY
Qty: 15 CAPSULE | Refills: 0 | Status: SHIPPED | OUTPATIENT
Start: 2023-01-25 | End: 2023-01-30

## 2023-01-25 RX ORDER — LISINOPRIL 2.5 MG/1
2.5 TABLET ORAL NIGHTLY
COMMUNITY

## 2023-01-25 RX ORDER — MIDAZOLAM HYDROCHLORIDE 1 MG/ML
INJECTION INTRAMUSCULAR; INTRAVENOUS AS NEEDED
Status: DISCONTINUED | OUTPATIENT
Start: 2023-01-25 | End: 2023-01-25 | Stop reason: SURG

## 2023-01-25 RX ORDER — SODIUM CHLORIDE 0.9 % (FLUSH) 0.9 %
3-10 SYRINGE (ML) INJECTION AS NEEDED
Status: DISCONTINUED | OUTPATIENT
Start: 2023-01-25 | End: 2023-01-25 | Stop reason: HOSPADM

## 2023-01-25 RX ORDER — DEXTROAMPHETAMINE SULFATE 5 MG/1
5 TABLET ORAL 2 TIMES DAILY
COMMUNITY

## 2023-01-25 RX ORDER — SODIUM CHLORIDE 9 MG/ML
40 INJECTION, SOLUTION INTRAVENOUS AS NEEDED
Status: DISCONTINUED | OUTPATIENT
Start: 2023-01-25 | End: 2023-01-25 | Stop reason: HOSPADM

## 2023-01-25 RX ORDER — KETAMINE HCL IN NACL, ISO-OSM 100MG/10ML
SYRINGE (ML) INJECTION AS NEEDED
Status: DISCONTINUED | OUTPATIENT
Start: 2023-01-25 | End: 2023-01-25 | Stop reason: SURG

## 2023-01-25 RX ORDER — ASPIRIN 325 MG
325 TABLET ORAL NIGHTLY
COMMUNITY

## 2023-01-25 RX ORDER — NALOXONE HCL 0.4 MG/ML
0.4 VIAL (ML) INJECTION
Status: CANCELLED | OUTPATIENT
Start: 2023-01-25

## 2023-01-25 RX ORDER — ONDANSETRON 2 MG/ML
4 INJECTION INTRAMUSCULAR; INTRAVENOUS EVERY 6 HOURS PRN
Status: CANCELLED | OUTPATIENT
Start: 2023-01-25

## 2023-01-25 RX ORDER — SODIUM CHLORIDE 0.9 % (FLUSH) 0.9 %
3 SYRINGE (ML) INJECTION EVERY 12 HOURS SCHEDULED
Status: DISCONTINUED | OUTPATIENT
Start: 2023-01-25 | End: 2023-01-25 | Stop reason: HOSPADM

## 2023-01-25 RX ORDER — LIDOCAINE HYDROCHLORIDE AND EPINEPHRINE BITARTRATE 20; .01 MG/ML; MG/ML
INJECTION, SOLUTION SUBCUTANEOUS
Status: DISCONTINUED | OUTPATIENT
Start: 2023-01-25 | End: 2023-01-25 | Stop reason: HOSPADM

## 2023-01-25 RX ORDER — BUPRENORPHINE HYDROCHLORIDE AND NALOXONE HYDROCHLORIDE DIHYDRATE 8; 2 MG/1; MG/1
1 TABLET SUBLINGUAL 2 TIMES DAILY
COMMUNITY

## 2023-01-25 RX ADMIN — CEFAZOLIN 2 G: 2 INJECTION, POWDER, FOR SOLUTION INTRAMUSCULAR; INTRAVENOUS at 08:29

## 2023-01-25 RX ADMIN — MIDAZOLAM 2 MG: 1 INJECTION INTRAMUSCULAR; INTRAVENOUS at 08:35

## 2023-01-25 RX ADMIN — PROPOFOL 100 MCG/KG/MIN: 10 INJECTION, EMULSION INTRAVENOUS at 08:37

## 2023-01-25 RX ADMIN — Medication 25 MG: at 08:35

## 2023-01-25 RX ADMIN — SODIUM CHLORIDE 100 ML/HR: 9 INJECTION, SOLUTION INTRAVENOUS at 08:32

## 2023-01-25 RX ADMIN — Medication 25 MG: at 08:56

## 2023-01-25 NOTE — ANESTHESIA POSTPROCEDURE EVALUATION
Patient: Yvonne Keys    Procedure Summary     Date: 01/25/23 Room / Location: [facility] [address] [place] [address] CATH INVASIVE LOCATION    Anesthesia Start: 0832 Anesthesia Stop: 0929    Procedure: ICD battery change [place] aware (Left: Chest) Diagnosis:       Nonrheumatic mitral valve regurgitation      ICD (implantable cardioverter-defibrillator) battery depletion      Dilated cardiomyopathy (HCC)      (ICD battery depletion)    Providers: Blayne Hawthorne MD Provider: Henrik Jimenez MD    Anesthesia Type: Not recorded ASA Status: Not recorded          Anesthesia Type: No value filed.    Vitals  Vitals Value Taken Time   /61 01/25/23 1110   Temp     Pulse 67 01/25/23 1110   Resp     SpO2 96 % 01/25/23 1110   Vitals shown include unvalidated device data.        Post Anesthesia Care and Evaluation    Patient location during evaluation: PACU  Patient participation: complete - patient participated  Level of consciousness: awake  Pain scale: See nurse's notes for pain score.  Pain management: adequate    Airway patency: patent  Anesthetic complications: No anesthetic complications  PONV Status: none  Cardiovascular status: acceptable  Respiratory status: acceptable  Hydration status: acceptable    Comments: Patient seen and examined postoperatively; vital signs stable; SpO2 greater than or equal to 90%; cardiopulmonary status stable; nausea/vomiting adequately controlled; pain adequately controlled; no apparent anesthesia complications; patient discharged from anesthesia care when discharge criteria were met

## 2023-01-25 NOTE — ANESTHESIA PREPROCEDURE EVALUATION
Anesthesia Evaluation     Patient summary reviewed and Nursing notes reviewed   NPO Solid Status: > 8 hours  NPO Liquid Status: > 8 hours           Airway   Mallampati: II  TM distance: >3 FB  Neck ROM: full  No difficulty expected  Dental - normal exam   (+) poor dentition    Pulmonary - normal exam   Cardiovascular - normal exam  Exercise tolerance: good (4-7 METS)    (+) valvular problems/murmurs, past MI ,       Neuro/Psych  (+) dizziness/light headedness,    GI/Hepatic/Renal/Endo    (+)   hepatitis, liver disease, renal disease,     Musculoskeletal     Abdominal  - normal exam    Bowel sounds: normal.   Substance History   (+) alcohol use,      OB/GYN          Other                        Anesthesia Plan    ASA 3     MAC     intravenous induction     Anesthetic plan, risks, benefits, and alternatives have been provided, discussed and informed consent has been obtained with: patient.    Plan discussed with CRNA.        CODE STATUS:

## 2023-02-02 ENCOUNTER — OFFICE VISIT (OUTPATIENT)
Dept: CARDIOLOGY | Facility: CLINIC | Age: 43
End: 2023-02-02
Payer: COMMERCIAL

## 2023-02-02 DIAGNOSIS — Z95.810 PRESENCE OF AUTOMATIC IMPLANTABLE CARDIOVERTER-DEFIBRILLATOR: Primary | ICD-10-CM

## 2023-02-02 PROCEDURE — 99024 POSTOP FOLLOW-UP VISIT: CPT | Performed by: NURSE PRACTITIONER

## 2023-02-02 NOTE — PROGRESS NOTES
Patient presents today for wound check, staple removal and device check.  Device rep Kylie interrogated with proper functioning, no events reported.  Wound is well approximated, well-healed with no swelling, drainage or bruising noted.  Staples removed by medical assistant Virgen Zyaas and Steri-Strips applied.

## 2023-02-28 ENCOUNTER — APPOINTMENT (OUTPATIENT)
Dept: GENERAL RADIOLOGY | Facility: HOSPITAL | Age: 43
End: 2023-02-28
Payer: COMMERCIAL

## 2023-02-28 ENCOUNTER — HOSPITAL ENCOUNTER (EMERGENCY)
Facility: HOSPITAL | Age: 43
Discharge: HOME | End: 2023-02-28
Admitting: EMERGENCY MEDICINE
Payer: COMMERCIAL

## 2023-02-28 VITALS
SYSTOLIC BLOOD PRESSURE: 116 MMHG | OXYGEN SATURATION: 95 % | TEMPERATURE: 98.6 F | BODY MASS INDEX: 23.6 KG/M2 | HEART RATE: 75 BPM | RESPIRATION RATE: 20 BRPM | HEIGHT: 61 IN | WEIGHT: 125 LBS | DIASTOLIC BLOOD PRESSURE: 79 MMHG

## 2023-02-28 DIAGNOSIS — R45.1 AGITATION: ICD-10-CM

## 2023-02-28 DIAGNOSIS — F11.93 WITHDRAWAL FROM OPIOIDS: Primary | ICD-10-CM

## 2023-02-28 DIAGNOSIS — R11.2 NAUSEA AND VOMITING, UNSPECIFIED VOMITING TYPE: ICD-10-CM

## 2023-02-28 LAB
ALBUMIN SERPL-MCNC: 4 G/DL (ref 3.5–5.2)
ALBUMIN/GLOB SERPL: 1.4 G/DL
ALP SERPL-CCNC: 79 U/L (ref 39–117)
ALT SERPL W P-5'-P-CCNC: 14 U/L (ref 1–33)
AMPHET+METHAMPHET UR QL: NEGATIVE
ANION GAP SERPL CALCULATED.3IONS-SCNC: 12 MMOL/L (ref 5–15)
APAP SERPL-MCNC: 82.1 MCG/ML (ref 0–30)
AST SERPL-CCNC: 18 U/L (ref 1–32)
BARBITURATES UR QL SCN: NEGATIVE
BASOPHILS # BLD AUTO: 0 10*3/MM3 (ref 0–0.2)
BASOPHILS NFR BLD AUTO: 0.7 % (ref 0–1.5)
BENZODIAZ UR QL SCN: NEGATIVE
BILIRUB SERPL-MCNC: 0.4 MG/DL (ref 0–1.2)
BILIRUB UR QL STRIP: NEGATIVE
BUN SERPL-MCNC: 12 MG/DL (ref 6–20)
BUN/CREAT SERPL: 21.1 (ref 7–25)
CALCIUM SPEC-SCNC: 9.5 MG/DL (ref 8.6–10.5)
CANNABINOIDS SERPL QL: NEGATIVE
CHLORIDE SERPL-SCNC: 100 MMOL/L (ref 98–107)
CK SERPL-CCNC: 67 U/L (ref 20–180)
CLARITY UR: CLEAR
CO2 SERPL-SCNC: 23 MMOL/L (ref 22–29)
COCAINE UR QL: NEGATIVE
COLOR UR: NORMAL
CREAT SERPL-MCNC: 0.57 MG/DL (ref 0.57–1)
DEPRECATED RDW RBC AUTO: 45.1 FL (ref 37–54)
EGFRCR SERPLBLD CKD-EPI 2021: 115.8 ML/MIN/1.73
EOSINOPHIL # BLD AUTO: 0 10*3/MM3 (ref 0–0.4)
EOSINOPHIL NFR BLD AUTO: 0.5 % (ref 0.3–6.2)
ERYTHROCYTE [DISTWIDTH] IN BLOOD BY AUTOMATED COUNT: 12.6 % (ref 12.3–15.4)
ETHANOL UR QL: <0.01 %
GLOBULIN UR ELPH-MCNC: 2.9 GM/DL
GLUCOSE SERPL-MCNC: 142 MG/DL (ref 65–99)
GLUCOSE UR STRIP-MCNC: NEGATIVE MG/DL
HCT VFR BLD AUTO: 39.1 % (ref 34–46.6)
HGB BLD-MCNC: 13.2 G/DL (ref 12–15.9)
HGB UR QL STRIP.AUTO: NEGATIVE
KETONES UR QL STRIP: NEGATIVE
LEUKOCYTE ESTERASE UR QL STRIP.AUTO: NEGATIVE
LIPASE SERPL-CCNC: 22 U/L (ref 13–60)
LYMPHOCYTES # BLD AUTO: 1.6 10*3/MM3 (ref 0.7–3.1)
LYMPHOCYTES NFR BLD AUTO: 23 % (ref 19.6–45.3)
MAGNESIUM SERPL-MCNC: 1.6 MG/DL (ref 1.6–2.6)
MCH RBC QN AUTO: 32.9 PG (ref 26.6–33)
MCHC RBC AUTO-ENTMCNC: 33.9 G/DL (ref 31.5–35.7)
MCV RBC AUTO: 97.1 FL (ref 79–97)
METHADONE UR QL SCN: NEGATIVE
MONOCYTES # BLD AUTO: 0.3 10*3/MM3 (ref 0.1–0.9)
MONOCYTES NFR BLD AUTO: 3.7 % (ref 5–12)
NEUTROPHILS NFR BLD AUTO: 4.8 10*3/MM3 (ref 1.7–7)
NEUTROPHILS NFR BLD AUTO: 72.1 % (ref 42.7–76)
NITRITE UR QL STRIP: NEGATIVE
NRBC BLD AUTO-RTO: 0 /100 WBC (ref 0–0.2)
OPIATES UR QL: NEGATIVE
OXYCODONE UR QL SCN: NEGATIVE
PH UR STRIP.AUTO: 6.5 [PH] (ref 5–8)
PLATELET # BLD AUTO: 399 10*3/MM3 (ref 140–450)
PMV BLD AUTO: 7 FL (ref 6–12)
POTASSIUM SERPL-SCNC: 3.9 MMOL/L (ref 3.5–5.2)
PROT SERPL-MCNC: 6.9 G/DL (ref 6–8.5)
PROT UR QL STRIP: NEGATIVE
RBC # BLD AUTO: 4.02 10*6/MM3 (ref 3.77–5.28)
SALICYLATES SERPL-MCNC: 0.4 MG/DL
SODIUM SERPL-SCNC: 135 MMOL/L (ref 136–145)
SP GR UR STRIP: 1.01 (ref 1–1.03)
TROPONIN T SERPL HS-MCNC: <6 NG/L
UROBILINOGEN UR QL STRIP: NORMAL
WBC NRBC COR # BLD: 6.7 10*3/MM3 (ref 3.4–10.8)

## 2023-02-28 PROCEDURE — 25010000002 LORAZEPAM PER 2 MG: Performed by: PHYSICIAN ASSISTANT

## 2023-02-28 PROCEDURE — 83690 ASSAY OF LIPASE: CPT | Performed by: PHYSICIAN ASSISTANT

## 2023-02-28 PROCEDURE — 81003 URINALYSIS AUTO W/O SCOPE: CPT | Performed by: PHYSICIAN ASSISTANT

## 2023-02-28 PROCEDURE — 83735 ASSAY OF MAGNESIUM: CPT | Performed by: PHYSICIAN ASSISTANT

## 2023-02-28 PROCEDURE — 82550 ASSAY OF CK (CPK): CPT | Performed by: PHYSICIAN ASSISTANT

## 2023-02-28 PROCEDURE — 80143 DRUG ASSAY ACETAMINOPHEN: CPT | Performed by: PHYSICIAN ASSISTANT

## 2023-02-28 PROCEDURE — 80307 DRUG TEST PRSMV CHEM ANLYZR: CPT | Performed by: PHYSICIAN ASSISTANT

## 2023-02-28 PROCEDURE — 80053 COMPREHEN METABOLIC PANEL: CPT | Performed by: PHYSICIAN ASSISTANT

## 2023-02-28 PROCEDURE — 36415 COLL VENOUS BLD VENIPUNCTURE: CPT

## 2023-02-28 PROCEDURE — 82077 ASSAY SPEC XCP UR&BREATH IA: CPT | Performed by: PHYSICIAN ASSISTANT

## 2023-02-28 PROCEDURE — 96375 TX/PRO/DX INJ NEW DRUG ADDON: CPT

## 2023-02-28 PROCEDURE — 96374 THER/PROPH/DIAG INJ IV PUSH: CPT

## 2023-02-28 PROCEDURE — 25010000002 ONDANSETRON PER 1 MG: Performed by: PHYSICIAN ASSISTANT

## 2023-02-28 PROCEDURE — 99283 EMERGENCY DEPT VISIT LOW MDM: CPT

## 2023-02-28 PROCEDURE — 71045 X-RAY EXAM CHEST 1 VIEW: CPT

## 2023-02-28 PROCEDURE — 84484 ASSAY OF TROPONIN QUANT: CPT | Performed by: PHYSICIAN ASSISTANT

## 2023-02-28 PROCEDURE — 85025 COMPLETE CBC W/AUTO DIFF WBC: CPT | Performed by: PHYSICIAN ASSISTANT

## 2023-02-28 PROCEDURE — 80179 DRUG ASSAY SALICYLATE: CPT | Performed by: PHYSICIAN ASSISTANT

## 2023-02-28 PROCEDURE — 93005 ELECTROCARDIOGRAM TRACING: CPT | Performed by: PHYSICIAN ASSISTANT

## 2023-02-28 RX ORDER — LORAZEPAM 2 MG/ML
1 INJECTION INTRAMUSCULAR ONCE
Status: COMPLETED | OUTPATIENT
Start: 2023-02-28 | End: 2023-02-28

## 2023-02-28 RX ORDER — SODIUM CHLORIDE 0.9 % (FLUSH) 0.9 %
10 SYRINGE (ML) INJECTION AS NEEDED
Status: DISCONTINUED | OUTPATIENT
Start: 2023-02-28 | End: 2023-02-28 | Stop reason: HOSPADM

## 2023-02-28 RX ORDER — ONDANSETRON 2 MG/ML
4 INJECTION INTRAMUSCULAR; INTRAVENOUS ONCE
Status: COMPLETED | OUTPATIENT
Start: 2023-02-28 | End: 2023-02-28

## 2023-02-28 RX ORDER — HYDROXYZINE HYDROCHLORIDE 25 MG/1
25 TABLET, FILM COATED ORAL EVERY 8 HOURS PRN
Qty: 30 TABLET | Refills: 0 | Status: SHIPPED | OUTPATIENT
Start: 2023-02-28

## 2023-02-28 RX ORDER — ONDANSETRON 4 MG/1
4 TABLET, ORALLY DISINTEGRATING ORAL EVERY 8 HOURS PRN
Qty: 20 TABLET | Refills: 0 | Status: SHIPPED | OUTPATIENT
Start: 2023-02-28

## 2023-02-28 RX ADMIN — LORAZEPAM 1 MG: 2 INJECTION INTRAMUSCULAR; INTRAVENOUS at 07:16

## 2023-02-28 RX ADMIN — ONDANSETRON 4 MG: 2 INJECTION INTRAMUSCULAR; INTRAVENOUS at 07:16

## 2023-02-28 RX ADMIN — SODIUM CHLORIDE, POTASSIUM CHLORIDE, SODIUM LACTATE AND CALCIUM CHLORIDE 1000 ML: 600; 310; 30; 20 INJECTION, SOLUTION INTRAVENOUS at 07:16

## 2023-03-05 LAB — QT INTERVAL: 387 MS

## 2023-04-08 PROCEDURE — 93296 REM INTERROG EVL PM/IDS: CPT | Performed by: INTERNAL MEDICINE

## 2023-04-08 PROCEDURE — 93295 DEV INTERROG REMOTE 1/2/MLT: CPT | Performed by: INTERNAL MEDICINE

## 2023-04-10 ENCOUNTER — OFFICE VISIT (OUTPATIENT)
Dept: CARDIOLOGY | Facility: CLINIC | Age: 43
End: 2023-04-10
Payer: COMMERCIAL

## 2023-04-10 DIAGNOSIS — Z95.810 PRESENCE OF AUTOMATIC IMPLANTABLE CARDIOVERTER-DEFIBRILLATOR: Primary | ICD-10-CM

## 2023-04-10 NOTE — PROGRESS NOTES
ICD generator change January 2023.  ICD site is clean  ICD interrogation attached to chart follow-up in 3 months      Electronically signed by Blayne Hawthorne MD, 04/10/23, 3:31 PM EDT.

## 2023-06-08 PROBLEM — I21.3 ST ELEVATION (STEMI) MYOCARDIAL INFARCTION: Status: ACTIVE | Noted: 2023-06-08

## 2023-08-23 ENCOUNTER — TELEPHONE (OUTPATIENT)
Dept: CARDIOLOGY | Facility: OTHER | Age: 43
End: 2023-08-23
Payer: COMMERCIAL

## 2023-08-23 RX ORDER — LISINOPRIL 2.5 MG/1
2.5 TABLET ORAL NIGHTLY
Qty: 30 TABLET | Refills: 2 | OUTPATIENT
Start: 2023-08-23

## 2023-08-23 RX ORDER — CARVEDILOL 3.12 MG/1
3.12 TABLET ORAL NIGHTLY
Qty: 60 TABLET | Refills: 1 | Status: SHIPPED | OUTPATIENT
Start: 2023-08-23

## 2023-08-23 RX ORDER — CARVEDILOL 3.12 MG/1
3.12 TABLET ORAL 2 TIMES DAILY WITH MEALS
Qty: 60 TABLET | Refills: 1 | OUTPATIENT
Start: 2023-08-23

## 2023-08-23 RX ORDER — LISINOPRIL 2.5 MG/1
2.5 TABLET ORAL NIGHTLY
Qty: 30 TABLET | Refills: 1 | Status: SHIPPED | OUTPATIENT
Start: 2023-08-23

## 2023-08-23 NOTE — TELEPHONE ENCOUNTER
Caller: Yvonne Keys    Relationship: Self    Best call back number: [phone]    Requested Prescriptions:   Requested Prescriptions     Pending Prescriptions Disp Refills    lisinopril (PRINIVIL,ZESTRIL) 2.5 MG tablet       Sig: Take 1 tablet by mouth Every Night.    carvedilol (COREG) 3.125 MG tablet       Sig: Take 2 tablets by mouth Every Night. Patient is prescribed 3.125mg BID but reports the patient says it makes her too sleepy.        Pharmacy where request should be sent:      Last office visit with prescribing clinician: 4/10/2023   Last telemedicine visit with prescribing clinician: Visit date not found   Next office visit with prescribing clinician: 11/6/2023         Does the patient have less than a 3 day supply:  [x] Yes  [] No    Would you like a call back once the refill request has been completed: [x] Yes [] No    If the office needs to give you a call back, can they leave a voicemail: [x] Yes [] No    Vaibhav Braden Rep   08/23/23 14:03 EDT

## 2023-10-24 RX ORDER — CARVEDILOL 3.12 MG/1
3.12 TABLET ORAL EVERY 12 HOURS SCHEDULED
Qty: 60 TABLET | Refills: 0 | Status: SHIPPED | OUTPATIENT
Start: 2023-10-24

## 2023-10-24 RX ORDER — LISINOPRIL 2.5 MG/1
2.5 TABLET ORAL
Qty: 30 TABLET | Refills: 0 | Status: SHIPPED | OUTPATIENT
Start: 2023-10-24

## 2023-11-06 ENCOUNTER — OFFICE VISIT (OUTPATIENT)
Dept: CARDIOLOGY | Facility: CLINIC | Age: 43
End: 2023-11-06
Payer: COMMERCIAL

## 2023-11-06 VITALS
SYSTOLIC BLOOD PRESSURE: 113 MMHG | DIASTOLIC BLOOD PRESSURE: 71 MMHG | WEIGHT: 133 LBS | OXYGEN SATURATION: 97 % | HEART RATE: 68 BPM | BODY MASS INDEX: 24.48 KG/M2 | HEIGHT: 62 IN

## 2023-11-06 DIAGNOSIS — I42.0 DILATED CARDIOMYOPATHY: ICD-10-CM

## 2023-11-06 DIAGNOSIS — Z95.810 PRESENCE OF AUTOMATIC IMPLANTABLE CARDIOVERTER-DEFIBRILLATOR: ICD-10-CM

## 2023-11-06 DIAGNOSIS — I34.0 NONRHEUMATIC MITRAL VALVE REGURGITATION: Primary | ICD-10-CM

## 2023-11-06 DIAGNOSIS — I38 VALVULAR HEART DISEASE: ICD-10-CM

## 2023-11-06 PROCEDURE — 99214 OFFICE O/P EST MOD 30 MIN: CPT | Performed by: INTERNAL MEDICINE

## 2023-11-06 PROCEDURE — 93000 ELECTROCARDIOGRAM COMPLETE: CPT | Performed by: INTERNAL MEDICINE

## 2023-11-06 PROCEDURE — 93282 PRGRMG EVAL IMPLANTABLE DFB: CPT | Performed by: INTERNAL MEDICINE

## 2023-11-06 RX ORDER — CARVEDILOL 3.12 MG/1
3.12 TABLET ORAL EVERY 12 HOURS SCHEDULED
Qty: 180 TABLET | Refills: 1 | Status: SHIPPED | OUTPATIENT
Start: 2023-11-06

## 2023-11-06 RX ORDER — LISINOPRIL 2.5 MG/1
2.5 TABLET ORAL
Qty: 90 TABLET | Refills: 1 | Status: SHIPPED | OUTPATIENT
Start: 2023-11-06

## 2023-11-06 NOTE — PROGRESS NOTES
CC--cardiomyopathy, fatigue      Sub  43-year-old pleasant patient well-known to me with single-chamber ICD with generator change back in January 2023 comes in for follow-up   She has history of nonischemic cardiomyopathy with original ICD implant 2015 and has history of mitral regurgitation  She has additional history of hepatitis C on treatment          My previous history is attached below for reference only which has been reviewed    Sub--41-year-old very pleasant patient with history of nonischemic cardiomyopathy underwent a single-chamber ICD implantation few years ago for primary prevention and came for follow.  She is able to take low-dose of beta-blockers and ACE inhibitors and denies any active leg edema or any syncope or dizziness since last visit.Patient denies any palpitations.She is trying to stop smoking  Chronic medical problems include nonischemic cardiomyopathy, ICD in situ, noncompliance and tobacco abuse  Prior echocardiography showed EF improvement to 50% with moderate to severe mitral regurgitation--she underwent echocardiography back in January showing moderate to severe MR followed by left and right heart catheterization showing 2+ MR without any evidence of pulmonary hypertension and left to medical management  She has no new symptoms and came for follow-up  She is exercising regularly and feels much better and denies any chest pain or dizziness and she is compliant with her medications  Patient complains of possible ICD shock and also complains of distal digital discomfort particularly at nighttime without claudication.        Past Medical History:   Diagnosis Date    Alcohol abuse     Cardiomyopathy (CMS/HCC)     Cocaine abuse (CMS/HCC)     Depression 2011    Fatigue     Hard of hearing     History of LEEP (loop electrosurgical excision procedure) of cervix complicating pregnancy     Kidney failure 2003    and 2001 with pregnancy    Myocardial infarction (CMS/HCC)      Past Surgical  History:   Procedure Laterality Date    CARDIAC CATHETERIZATION      CARDIAC CATHETERIZATION N/A 2/12/2020    Procedure: Right and Left Heart Cath;  Surgeon: Bro Marshall DO;  Location: [facility] CATH INVASIVE LOCATION;  Service: Cardiology;  Laterality: N/A;    CARDIAC DEFIBRILLATOR PLACEMENT      LAPAROSCOPIC TUBAL LIGATION  2003         Physical Exam    General:      well developed, well nourished, in no acute distress.    Head:      normocephalic and atraumatic.    Eyes:      PERRL/EOM intact, conjunctivae and sclerae clear without nystagmus.    Neck:      no  thyromegaly, trachea central with normal respiratory effort  Lungs:      clear bilaterally to auscultation.    Heart:       regular rate and rhythm, S1, S2 without murmurs, rubs, or gallops  Skin:      intact without lesions or rashes.    Psych:      alert and cooperative; normal mood and affect; normal attention span and concentration.        ICD site is clean      A/P    ICD in situ with battery change in January 2023 and ICD interrogated and interrogation attached to chart with normal function  Nonischemic cardiomyopathy with EF improvement to 50% on medical management currently on carvedilol and lisinopril  Moderate mitral regurgitation  History of hepatitis C on treatment  Compensated chronic class II systolic heart failure stable  Recent potassium of 5.1.  Creatinine is normal.  LFTs are normal  Medications reviewed and follow-up appointments made    Medications refilled      ECG 12 Lead    Date/Time: 11/6/2023 10:03 AM  Performed by: Blayne Hawthorne MD    Authorized by: Blayne Hawthorne MD  Comparison: compared with previous ECG   Similar to previous ECG  Rhythm: sinus rhythm  Rate: normal  Conduction: conduction normal  Other findings: non-specific ST-T wave changes        Electronically signed by Blayne Hawthorne MD, 11/06/23, 10:03 AM EST.

## 2024-05-09 RX ORDER — CARVEDILOL 3.12 MG/1
3.12 TABLET ORAL EVERY 12 HOURS SCHEDULED
Qty: 60 TABLET | Refills: 5 | Status: SHIPPED | OUTPATIENT
Start: 2024-05-09

## 2024-05-23 ENCOUNTER — OFFICE VISIT (OUTPATIENT)
Dept: CARDIOLOGY | Facility: CLINIC | Age: 44
End: 2024-05-23
Payer: COMMERCIAL

## 2024-05-23 VITALS
HEIGHT: 62 IN | OXYGEN SATURATION: 97 % | BODY MASS INDEX: 23.55 KG/M2 | DIASTOLIC BLOOD PRESSURE: 88 MMHG | SYSTOLIC BLOOD PRESSURE: 129 MMHG | HEART RATE: 69 BPM | WEIGHT: 128 LBS

## 2024-05-23 DIAGNOSIS — Z95.810 PRESENCE OF AUTOMATIC IMPLANTABLE CARDIOVERTER-DEFIBRILLATOR: ICD-10-CM

## 2024-05-23 DIAGNOSIS — I34.0 NONRHEUMATIC MITRAL VALVE REGURGITATION: Primary | ICD-10-CM

## 2024-05-23 DIAGNOSIS — I42.0 DILATED CARDIOMYOPATHY: ICD-10-CM

## 2024-05-23 DIAGNOSIS — I38 VALVULAR HEART DISEASE: ICD-10-CM

## 2024-05-23 PROCEDURE — 93000 ELECTROCARDIOGRAM COMPLETE: CPT | Performed by: INTERNAL MEDICINE

## 2024-05-23 PROCEDURE — 99214 OFFICE O/P EST MOD 30 MIN: CPT | Performed by: INTERNAL MEDICINE

## 2024-05-23 RX ORDER — LISINOPRIL 2.5 MG/1
2.5 TABLET ORAL
Qty: 90 TABLET | Refills: 1 | Status: SHIPPED | OUTPATIENT
Start: 2024-05-23

## 2024-05-23 RX ORDER — CARVEDILOL 3.12 MG/1
3.12 TABLET ORAL EVERY 12 HOURS SCHEDULED
Qty: 180 TABLET | Refills: 1 | Status: SHIPPED | OUTPATIENT
Start: 2024-05-23

## 2024-05-23 NOTE — PROGRESS NOTES
CC--cardiomyopathy, fatigue      Sub  43-year-old pleasant patient has single-chamber ICD and comes in for 6-month follow-up.  Patient has history of nonischemic cardiomyopathy and ICD was implanted in 2015 and battery change in 23.  She also has history of hepatitis C under treatment and history of mitral regurgitation.  Patient has positive history of familial non compaction        My previous history is attached below for reference only which has been reviewed    Sub--41-year-old very pleasant patient with history of nonischemic cardiomyopathy underwent a single-chamber ICD implantation few years ago for primary prevention and came for follow.  She is able to take low-dose of beta-blockers and ACE inhibitors and denies any active leg edema or any syncope or dizziness since last visit.Patient denies any palpitations.She is trying to stop smoking  Chronic medical problems include nonischemic cardiomyopathy, ICD in situ, noncompliance and tobacco abuse  Prior echocardiography showed EF improvement to 50% with moderate to severe mitral regurgitation--she underwent echocardiography back in January showing moderate to severe MR followed by left and right heart catheterization showing 2+ MR without any evidence of pulmonary hypertension and left to medical management  She has no new symptoms and came for follow-up  She is exercising regularly and feels much better and denies any chest pain or dizziness and she is compliant with her medications  Patient complains of possible ICD shock and also complains of distal digital discomfort particularly at nighttime without claudication.        Past Medical History:   Diagnosis Date    Alcohol abuse     Cardiomyopathy (CMS/HCC)     Cocaine abuse (CMS/HCC)     Depression 2011    Fatigue     Hard of hearing     History of LEEP (loop electrosurgical excision procedure) of cervix complicating pregnancy     Kidney failure 2003    and 2001 with pregnancy    Myocardial infarction  (CMS/HCC)      Past Surgical History:   Procedure Laterality Date    CARDIAC CATHETERIZATION      CARDIAC CATHETERIZATION N/A 2/12/2020    Procedure: Right and Left Heart Cath;  Surgeon: Bro Marshall DO;  Location: [facility] CATH INVASIVE LOC[facility];  Service: Cardiology;  Laterality: N/A;    CARDIAC DEFIBRILLATOR PLACEMENT      LAPAROSCOPIC TUBAL LIGATION  2003         Physical Exam    General:      well developed, well nourished, in no acute distress.    Head:      normocephalic and atraumatic.    Eyes:      PERRL/EOM intact, conjunctivae and sclerae clear without nystagmus.    Neck:      no  thyromegaly, trachea central with normal respiratory effort  Lungs:      clear bilaterally to auscultation.    Heart:       regular rate and rhythm, S1, S2 without murmurs, rubs, or gallops  Skin:      intact without lesions or rashes.    Psych:      alert and cooperative; normal mood and affect; normal attention span and concentration.            A/P    ICD in situ from with normal function home monitoring which was reviewed  Nonischemic cardiomyopathy with EF improvement to 50% medical management on carvedilol and lisinopril  Moderate mitral regurgitation stable on lisinopril  History of hepatitis C under treatment  Well compensated chronic class II systolic heart failure currently on medical management including carvedilol and lisinopril  Medications reviewed and follow-up appointments made  Gene testing ordered  Check echo for MR  Meds refilled    ECG 12 Lead    Date/Time: 5/23/2024 10:57 AM  Performed by: Blayne Hawthorne MD    Authorized by: Blayne Hawthorne MD  Comparison: compared with previous ECG   Similar to previous ECG  Rhythm: sinus rhythm  Rate: normal  Conduction: conduction normal  Other findings: left atrial abnormality        Electronically signed by Blayne Hawthorne MD, 05/23/24, 10:56 AM EDT.

## 2024-09-30 ENCOUNTER — TELEPHONE (OUTPATIENT)
Dept: CARDIOLOGY | Facility: CLINIC | Age: 44
End: 2024-09-30
Payer: COMMERCIAL

## 2024-09-30 NOTE — TELEPHONE ENCOUNTER
Caller: Yvonne Keys    Relationship: Self    Best call back number: [phone]    What form or medical record are you requesting: NEEDS LETTER STATING PT IS MEDICALLY FRAIL     Who is requesting this form or medical record from you: [facility]    How would you like to receive the form or medical records (pick-up, mail, fax):   If fax, what is the fax number:   If mail, what is the address:     If pick-up, provide patient with address and location details  PATIENT WILL  AT OFFICE    Timeframe paperwork needed: ASAP    Additional notes: CALL PT FOR MORE INFORMATION IF NEEDED

## 2024-10-01 NOTE — TELEPHONE ENCOUNTER
Attempted to reach pt I lvm for return call.    Tetracycline Counseling: Patient counseled regarding possible photosensitivity and increased risk for sunburn.  Patient instructed to avoid sunlight, if possible.  When exposed to sunlight, patients should wear protective clothing, sunglasses, and sunscreen.  The patient was instructed to call the office immediately if the following severe adverse effects occur:  hearing changes, easy bruising/bleeding, severe headache, or vision changes.  The patient verbalized understanding of the proper use and possible adverse effects of tetracycline.  All of the patient's questions and concerns were addressed. Patient understands to avoid pregnancy while on therapy due to potential birth defects.

## 2024-10-01 NOTE — TELEPHONE ENCOUNTER
Spoke with pt I let her know having a pacemaker does not necessarily mean she is medically frail. I let her know her device does not pace often and would not make her frail. Pt verbalized understanding and will call with any other concerns.

## 2024-10-21 ENCOUNTER — HOSPITAL ENCOUNTER (OUTPATIENT)
Dept: CARDIOLOGY | Facility: HOSPITAL | Age: 44
Discharge: HOME | End: 2024-10-21
Admitting: INTERNAL MEDICINE
Payer: COMMERCIAL

## 2024-10-21 VITALS
DIASTOLIC BLOOD PRESSURE: 71 MMHG | SYSTOLIC BLOOD PRESSURE: 129 MMHG | BODY MASS INDEX: 23.55 KG/M2 | WEIGHT: 128 LBS | HEIGHT: 62 IN

## 2024-10-21 DIAGNOSIS — I34.0 NONRHEUMATIC MITRAL VALVE REGURGITATION: ICD-10-CM

## 2024-10-21 PROCEDURE — 93306 TTE W/DOPPLER COMPLETE: CPT

## 2024-10-21 PROCEDURE — 93306 TTE W/DOPPLER COMPLETE: CPT | Performed by: INTERNAL MEDICINE

## 2024-10-22 LAB
BH CV ECHO MEAS - ACS: 1.75 CM
BH CV ECHO MEAS - AI P1/2T: 670.1 MSEC
BH CV ECHO MEAS - AO MAX PG: 9 MMHG
BH CV ECHO MEAS - AO MEAN PG: 4.8 MMHG
BH CV ECHO MEAS - AO ROOT DIAM: 2.8 CM
BH CV ECHO MEAS - AO V2 MAX: 150.1 CM/SEC
BH CV ECHO MEAS - AO V2 VTI: 36.8 CM
BH CV ECHO MEAS - AVA(I,D): 2.08 CM2
BH CV ECHO MEAS - EDV(CUBED): 77.2 ML
BH CV ECHO MEAS - EDV(MOD-SP4): 50.1 ML
BH CV ECHO MEAS - EF(MOD-BP): 41 %
BH CV ECHO MEAS - EF(MOD-SP4): 40.9 %
BH CV ECHO MEAS - ESV(CUBED): 40.7 ML
BH CV ECHO MEAS - ESV(MOD-SP4): 29.6 ML
BH CV ECHO MEAS - FS: 19.3 %
BH CV ECHO MEAS - IVS/LVPW: 0.5 CM
BH CV ECHO MEAS - IVSD: 0.59 CM
BH CV ECHO MEAS - LA DIMENSION: 3.4 CM
BH CV ECHO MEAS - LV DIASTOLIC VOL/BSA (35-75): 31.7 CM2
BH CV ECHO MEAS - LV MASS(C)D: 120.9 GRAMS
BH CV ECHO MEAS - LV MAX PG: 4.1 MMHG
BH CV ECHO MEAS - LV MEAN PG: 2.04 MMHG
BH CV ECHO MEAS - LV SYSTOLIC VOL/BSA (12-30): 18.7 CM2
BH CV ECHO MEAS - LV V1 MAX: 101.7 CM/SEC
BH CV ECHO MEAS - LV V1 VTI: 22.9 CM
BH CV ECHO MEAS - LVIDD: 4.3 CM
BH CV ECHO MEAS - LVIDS: 3.4 CM
BH CV ECHO MEAS - LVOT AREA: 3.3 CM2
BH CV ECHO MEAS - LVOT DIAM: 2.06 CM
BH CV ECHO MEAS - LVPWD: 1.2 CM
BH CV ECHO MEAS - MR MAX PG: 146.6 MMHG
BH CV ECHO MEAS - MR MAX VEL: 605 CM/SEC
BH CV ECHO MEAS - MV A MAX VEL: 93.8 CM/SEC
BH CV ECHO MEAS - MV DEC SLOPE: 740.9 CM/SEC2
BH CV ECHO MEAS - MV DEC TIME: 0.16 SEC
BH CV ECHO MEAS - MV E MAX VEL: 118.6 CM/SEC
BH CV ECHO MEAS - MV E/A: 1.26
BH CV ECHO MEAS - MV MAX PG: 7.2 MMHG
BH CV ECHO MEAS - MV MEAN PG: 3.4 MMHG
BH CV ECHO MEAS - MV V2 VTI: 46.9 CM
BH CV ECHO MEAS - MVA(VTI): 1.63 CM2
BH CV ECHO MEAS - PA ACC TIME: 0.12 SEC
BH CV ECHO MEAS - PA V2 MAX: 85.9 CM/SEC
BH CV ECHO MEAS - PULM A REVS DUR: 0.12 SEC
BH CV ECHO MEAS - PULM A REVS VEL: 28.2 CM/SEC
BH CV ECHO MEAS - PULM DIAS VEL: 36.1 CM/SEC
BH CV ECHO MEAS - PULM S/D: 1.45
BH CV ECHO MEAS - PULM SYS VEL: 52.4 CM/SEC
BH CV ECHO MEAS - RAP SYSTOLE: 3 MMHG
BH CV ECHO MEAS - RV MAX PG: 2.6 MMHG
BH CV ECHO MEAS - RV V1 MAX: 81.2 CM/SEC
BH CV ECHO MEAS - RV V1 VTI: 18.5 CM
BH CV ECHO MEAS - RVDD: 2.9 CM
BH CV ECHO MEAS - RVSP: 15.7 MMHG
BH CV ECHO MEAS - SV(LVOT): 76.6 ML
BH CV ECHO MEAS - SV(MOD-SP4): 20.5 ML
BH CV ECHO MEAS - SVI(LVOT): 48.4 ML/M2
BH CV ECHO MEAS - SVI(MOD-SP4): 13 ML/M2
BH CV ECHO MEAS - TR MAX PG: 12.7 MMHG
BH CV ECHO MEAS - TR MAX VEL: 178.4 CM/SEC

## 2024-11-04 ENCOUNTER — TELEPHONE (OUTPATIENT)
Dept: CARDIOLOGY | Facility: CLINIC | Age: 44
End: 2024-11-04
Payer: COMMERCIAL

## 2024-11-04 NOTE — TELEPHONE ENCOUNTER
Caller: Yvonne Keys    Relationship: Self    Best call back number:[phone]    Caller requesting test results: SELF    What test was performed: ECHO    When was the test performed: 10.23.24    Where was the test performed: PRACTICE    Additional notes: PATIENT REQUESTING ECHO RESULTS PLEASE ADVISE.

## 2024-11-06 NOTE — TELEPHONE ENCOUNTER
Attempted to reach pt lvm for return call. To discuss results.     Ok for hub to release.     Blayne Hawthorne MD Nichols, Janet E, MA  Her echo is unchanged  Heart function and 41%  Moderate mitral valve leakage unchanged    Dr SHEPPARD

## 2025-01-13 RX ORDER — LISINOPRIL 2.5 MG/1
2.5 TABLET ORAL
Qty: 90 TABLET | Refills: 1 | Status: SHIPPED | OUTPATIENT
Start: 2025-01-13

## 2025-02-17 RX ORDER — LISINOPRIL 2.5 MG/1
2.5 TABLET ORAL
Qty: 90 TABLET | Refills: 0 | Status: SHIPPED | OUTPATIENT
Start: 2025-02-17

## 2025-02-17 RX ORDER — CARVEDILOL 3.12 MG/1
3.12 TABLET ORAL EVERY 12 HOURS SCHEDULED
Qty: 180 TABLET | Refills: 0 | Status: SHIPPED | OUTPATIENT
Start: 2025-02-17

## 2025-05-22 RX ORDER — LISINOPRIL 2.5 MG/1
2.5 TABLET ORAL
Qty: 90 TABLET | Refills: 0 | Status: SHIPPED | OUTPATIENT
Start: 2025-05-22

## 2025-05-27 RX ORDER — CARVEDILOL 3.12 MG/1
3.12 TABLET ORAL EVERY 12 HOURS SCHEDULED
Qty: 180 TABLET | Refills: 0 | Status: SHIPPED | OUTPATIENT
Start: 2025-05-27

## 2025-07-11 RX ORDER — CARVEDILOL 3.12 MG/1
3.12 TABLET ORAL EVERY 12 HOURS SCHEDULED
Qty: 180 TABLET | Refills: 0 | Status: SHIPPED | OUTPATIENT
Start: 2025-07-11

## 2025-07-11 RX ORDER — LISINOPRIL 2.5 MG/1
2.5 TABLET ORAL
Qty: 90 TABLET | Refills: 0 | Status: SHIPPED | OUTPATIENT
Start: 2025-07-11

## 2025-08-18 RX ORDER — LISINOPRIL 2.5 MG/1
2.5 TABLET ORAL
Qty: 90 TABLET | Refills: 0 | OUTPATIENT
Start: 2025-08-18

## 2025-08-18 RX ORDER — LISINOPRIL 2.5 MG/1
2.5 TABLET ORAL
Qty: 90 TABLET | Refills: 0 | Status: SHIPPED | OUTPATIENT
Start: 2025-08-18

## (undated) DEVICE — DRSNG WND BORDR/ADHS NONADHR/GZ LF 4X4IN STRL

## (undated) DEVICE — SWAN-GANZ TRUE SIZE THERMODILUTION "S" TIP: Brand: SWAN-GANZ TRUE SIZE

## (undated) DEVICE — STPLR SKIN COUNT W/35STPL SS WHT DISP STRL

## (undated) DEVICE — SUT MNCRYL 2/0 CT1 36IN

## (undated) DEVICE — Device

## (undated) DEVICE — RADIFOCUS OBTURATOR: Brand: RADIFOCUS

## (undated) DEVICE — ELECTRD DEFIB M/FUNC PROPADZ RADIOL 2PK

## (undated) DEVICE — CATH DIAG IMPULSE FR4 6F 100CM

## (undated) DEVICE — PINNACLE INTRODUCER SHEATH: Brand: PINNACLE

## (undated) DEVICE — 3M™ IOBAN™ 2 ANTIMICROBIAL INCISE DRAPE 6650EZ: Brand: IOBAN™ 2

## (undated) DEVICE — PACEMAKER CDS: Brand: MEDLINE INDUSTRIES, INC.

## (undated) DEVICE — GW DIAG EMERALD HEPCOAT MOVE JTIP STD .035 3MM 150CM

## (undated) DEVICE — CABL BIPOL W/ALLGTR CLIP/SM 12FT

## (undated) DEVICE — SUT ETHIB 0/0 MO6 I8IN CX45D

## (undated) DEVICE — PLASMABLADE PS200-040 4.0: Brand: PLASMABLADE™

## (undated) DEVICE — CATH DIAG IMPULSE FL4 6F 100CM

## (undated) DEVICE — ST ACC MICROPUNCTURE STFF/CANN PLAT/TP 4F 21G 40CM

## (undated) DEVICE — PK TRY HEART CATH 50

## (undated) DEVICE — SUT MNCRYL 3/0 SH 27 IN Y416H

## (undated) DEVICE — CATH DIAG IMPULSE PIG .056 6F 110CM

## (undated) DEVICE — 3M™ PATIENT PLATE, CORDED, SPLIT, LARGE, 40 PER CASE, 1179: Brand: 3M™